# Patient Record
Sex: FEMALE | Race: WHITE | NOT HISPANIC OR LATINO | Employment: PART TIME | URBAN - METROPOLITAN AREA
[De-identification: names, ages, dates, MRNs, and addresses within clinical notes are randomized per-mention and may not be internally consistent; named-entity substitution may affect disease eponyms.]

---

## 2017-01-19 ENCOUNTER — TREATMENT (OUTPATIENT)
Dept: PHYSICAL THERAPY | Facility: CLINIC | Age: 37
End: 2017-01-19

## 2017-01-19 DIAGNOSIS — Z02.1 PRE-EMPLOYMENT HEALTH SCREENING EXAMINATION: Primary | ICD-10-CM

## 2017-01-19 PROCEDURE — PDS: Performed by: PHYSICAL THERAPIST

## 2017-01-19 NOTE — PROGRESS NOTES
See full documentation of patient's full treatment in paper chart dated 1/19/2017.  Chart will be in [facility] Med dept.

## 2018-07-30 ENCOUNTER — OFFICE VISIT CONVERTED (OUTPATIENT)
Dept: FAMILY MEDICINE CLINIC | Age: 38
End: 2018-07-30
Attending: FAMILY MEDICINE

## 2019-01-11 ENCOUNTER — OFFICE VISIT CONVERTED (OUTPATIENT)
Dept: FAMILY MEDICINE CLINIC | Age: 39
End: 2019-01-11
Attending: FAMILY MEDICINE

## 2019-01-14 ENCOUNTER — HOSPITAL ENCOUNTER (OUTPATIENT)
Dept: OTHER | Facility: HOSPITAL | Age: 39
Discharge: HOME | End: 2019-01-14
Attending: FAMILY MEDICINE

## 2019-01-14 LAB
ALBUMIN SERPL-MCNC: 4.6 G/DL (ref 3.5–5)
ALBUMIN/GLOB SERPL: 1.6 {RATIO} (ref 1.4–2.6)
ALP SERPL-CCNC: 78 U/L (ref 42–98)
ALT SERPL-CCNC: 13 U/L (ref 10–40)
ANION GAP SERPL CALC-SCNC: 17 MMOL/L (ref 8–19)
AST SERPL-CCNC: 17 U/L (ref 15–50)
BASOPHILS # BLD MANUAL: 0.06 10*3/UL (ref 0–0.2)
BASOPHILS NFR BLD MANUAL: 0.9 % (ref 0–3)
BILIRUB SERPL-MCNC: 0.26 MG/DL (ref 0.2–1.3)
BUN SERPL-MCNC: 7 MG/DL (ref 5–25)
BUN/CREAT SERPL: 10 {RATIO} (ref 6–20)
CALCIUM SERPL-MCNC: 9.1 MG/DL (ref 8.7–10.4)
CHLORIDE SERPL-SCNC: 105 MMOL/L (ref 99–111)
CHOLEST SERPL-MCNC: 204 MG/DL (ref 107–200)
CHOLEST/HDLC SERPL: 3.8 {RATIO} (ref 3–6)
CONV CO2: 23 MMOL/L (ref 22–32)
CONV TOTAL PROTEIN: 7.4 G/DL (ref 6.3–8.2)
CREAT UR-MCNC: 0.68 MG/DL (ref 0.5–0.9)
DEPRECATED RDW RBC AUTO: 44.4 FL
EOSINOPHIL # BLD MANUAL: 0.23 10*3/UL (ref 0–0.7)
EOSINOPHIL NFR BLD MANUAL: 3.4 % (ref 0–7)
ERYTHROCYTE [DISTWIDTH] IN BLOOD BY AUTOMATED COUNT: 13.5 % (ref 11.5–14.5)
GFR SERPLBLD BASED ON 1.73 SQ M-ARVRAT: >60 ML/MIN/{1.73_M2}
GLOBULIN UR ELPH-MCNC: 2.8 G/DL (ref 2–3.5)
GLUCOSE SERPL-MCNC: 101 MG/DL (ref 65–99)
GRANS (ABSOLUTE): 4.43 10*3/UL (ref 2–8)
GRANS: 65.2 % (ref 30–85)
HBA1C MFR BLD: 13.3 G/DL (ref 12–16)
HCT VFR BLD AUTO: 40.8 % (ref 37–47)
HDLC SERPL-MCNC: 54 MG/DL (ref 40–60)
IMM GRANULOCYTES # BLD: 0.01 10*3/UL (ref 0–0.54)
IMM GRANULOCYTES NFR BLD: 0.1 % (ref 0–0.43)
LDLC SERPL CALC-MCNC: 129 MG/DL (ref 70–100)
LYMPHOCYTES # BLD MANUAL: 1.62 10*3/UL (ref 1–5)
LYMPHOCYTES NFR BLD MANUAL: 6.5 % (ref 3–10)
MCH RBC QN AUTO: 29.2 PG (ref 27–31)
MCHC RBC AUTO-ENTMCNC: 32.6 G/DL (ref 33–37)
MCV RBC AUTO: 89.5 FL (ref 81–99)
MONOCYTES # BLD AUTO: 0.44 10*3/UL (ref 0.2–1.2)
OSMOLALITY SERPL CALC.SUM OF ELEC: 288 MOSM/KG (ref 273–304)
PLATELET # BLD AUTO: 295 10*3/UL (ref 130–400)
PMV BLD AUTO: 10.6 FL (ref 7.4–10.4)
POTASSIUM SERPL-SCNC: 4.5 MMOL/L (ref 3.5–5.3)
RBC # BLD AUTO: 4.56 10*6/UL (ref 4.2–5.4)
SODIUM SERPL-SCNC: 140 MMOL/L (ref 135–147)
TRIGL SERPL-MCNC: 103 MG/DL (ref 40–150)
VARIANT LYMPHS NFR BLD MANUAL: 23.9 % (ref 20–45)
VLDLC SERPL-MCNC: 21 MG/DL (ref 5–37)
WBC # BLD AUTO: 6.79 10*3/UL (ref 4.8–10.8)

## 2019-02-26 ENCOUNTER — OFFICE VISIT CONVERTED (OUTPATIENT)
Dept: FAMILY MEDICINE CLINIC | Age: 39
End: 2019-02-26
Attending: NURSE PRACTITIONER

## 2019-11-21 ENCOUNTER — OFFICE VISIT CONVERTED (OUTPATIENT)
Dept: FAMILY MEDICINE CLINIC | Age: 39
End: 2019-11-21
Attending: NURSE PRACTITIONER

## 2020-02-14 ENCOUNTER — OFFICE VISIT CONVERTED (OUTPATIENT)
Dept: FAMILY MEDICINE CLINIC | Age: 40
End: 2020-02-14
Attending: NURSE PRACTITIONER

## 2020-02-18 ENCOUNTER — HOSPITAL ENCOUNTER (OUTPATIENT)
Dept: OTHER | Facility: HOSPITAL | Age: 40
Discharge: HOME | End: 2020-02-18
Attending: NURSE PRACTITIONER

## 2020-04-29 ENCOUNTER — OFFICE VISIT CONVERTED (OUTPATIENT)
Dept: FAMILY MEDICINE CLINIC | Age: 40
End: 2020-04-29
Attending: NURSE PRACTITIONER

## 2020-05-05 ENCOUNTER — HOSPITAL ENCOUNTER (OUTPATIENT)
Dept: OTHER | Facility: HOSPITAL | Age: 40
Discharge: HOME | End: 2020-05-05
Attending: NURSE PRACTITIONER

## 2020-05-05 ENCOUNTER — OFFICE VISIT CONVERTED (OUTPATIENT)
Dept: FAMILY MEDICINE CLINIC | Age: 40
End: 2020-05-05
Attending: NURSE PRACTITIONER

## 2020-08-19 ENCOUNTER — OFFICE VISIT CONVERTED (OUTPATIENT)
Dept: FAMILY MEDICINE CLINIC | Age: 40
End: 2020-08-19
Attending: NURSE PRACTITIONER

## 2020-08-19 ENCOUNTER — HOSPITAL ENCOUNTER (OUTPATIENT)
Dept: OTHER | Facility: HOSPITAL | Age: 40
Discharge: HOME | End: 2020-08-19
Attending: NURSE PRACTITIONER

## 2020-08-20 LAB
ALBUMIN SERPL-MCNC: 4.2 G/DL (ref 3.5–5)
ALBUMIN/GLOB SERPL: 1.4 {RATIO} (ref 1.4–2.6)
ALP SERPL-CCNC: 88 U/L (ref 42–98)
ALT SERPL-CCNC: 18 U/L (ref 10–40)
ANION GAP SERPL CALC-SCNC: 15 MMOL/L (ref 8–19)
AST SERPL-CCNC: 24 U/L (ref 15–50)
BASOPHILS # BLD MANUAL: 0.04 10*3/UL (ref 0–0.2)
BASOPHILS NFR BLD MANUAL: 0.6 % (ref 0–3)
BILIRUB SERPL-MCNC: <0.15 MG/DL (ref 0.2–1.3)
BUN SERPL-MCNC: 6 MG/DL (ref 5–25)
BUN/CREAT SERPL: 7 {RATIO} (ref 6–20)
CALCIUM SERPL-MCNC: 9.6 MG/DL (ref 8.7–10.4)
CHLORIDE SERPL-SCNC: 104 MMOL/L (ref 99–111)
CHOLEST SERPL-MCNC: 208 MG/DL (ref 107–200)
CHOLEST/HDLC SERPL: 3.9 {RATIO} (ref 3–6)
CONV CO2: 24 MMOL/L (ref 22–32)
CONV TOTAL PROTEIN: 7.1 G/DL (ref 6.3–8.2)
CREAT UR-MCNC: 0.83 MG/DL (ref 0.5–0.9)
DEPRECATED RDW RBC AUTO: 44.7 FL
EOSINOPHIL # BLD MANUAL: 0.14 10*3/UL (ref 0–0.7)
EOSINOPHIL NFR BLD MANUAL: 2 % (ref 0–7)
ERYTHROCYTE [DISTWIDTH] IN BLOOD BY AUTOMATED COUNT: 13.2 % (ref 11.5–14.5)
GFR SERPLBLD BASED ON 1.73 SQ M-ARVRAT: >60 ML/MIN/{1.73_M2}
GLOBULIN UR ELPH-MCNC: 2.9 G/DL (ref 2–3.5)
GLUCOSE SERPL-MCNC: 95 MG/DL (ref 65–99)
GRANS (ABSOLUTE): 5.1 10*3/UL (ref 2–8)
GRANS: 72.9 % (ref 30–85)
HBA1C MFR BLD: 12.6 G/DL (ref 12–16)
HCT VFR BLD AUTO: 38.9 % (ref 37–47)
HDLC SERPL-MCNC: 54 MG/DL (ref 40–60)
IMM GRANULOCYTES # BLD: 0.01 10*3/UL (ref 0–0.54)
IMM GRANULOCYTES NFR BLD: 0.1 % (ref 0–0.43)
LDLC SERPL CALC-MCNC: 140 MG/DL (ref 70–100)
LYMPHOCYTES # BLD MANUAL: 1.19 10*3/UL (ref 1–5)
LYMPHOCYTES NFR BLD MANUAL: 7.4 % (ref 3–10)
MCH RBC QN AUTO: 29.3 PG (ref 27–31)
MCHC RBC AUTO-ENTMCNC: 32.4 G/DL (ref 33–37)
MCV RBC AUTO: 90.5 FL (ref 81–99)
MONOCYTES # BLD AUTO: 0.52 10*3/UL (ref 0.2–1.2)
OSMOLALITY SERPL CALC.SUM OF ELEC: 285 MOSM/KG (ref 273–304)
PLATELET # BLD AUTO: 282 10*3/UL (ref 130–400)
PMV BLD AUTO: 10 FL (ref 7.4–10.4)
POTASSIUM SERPL-SCNC: 4.2 MMOL/L (ref 3.5–5.3)
RBC # BLD AUTO: 4.3 10*6/UL (ref 4.2–5.4)
SODIUM SERPL-SCNC: 139 MMOL/L (ref 135–147)
TRIGL SERPL-MCNC: 69 MG/DL (ref 40–150)
TSH SERPL-ACNC: 1.37 M[IU]/L (ref 0.27–4.2)
VARIANT LYMPHS NFR BLD MANUAL: 17 % (ref 20–45)
VLDLC SERPL-MCNC: 14 MG/DL (ref 5–37)
WBC # BLD AUTO: 7 10*3/UL (ref 4.8–10.8)

## 2020-08-21 LAB
ASO AB SERPL-ACNC: 181 [IU]/ML (ref 0–200)
CONV RHEUMATOID FACTOR IGM: <10 [IU]/ML (ref 0–14)
CRP SERPL-MCNC: 2.9 MG/L (ref 0–5)
DSDNA AB SER-ACNC: NEGATIVE [IU]/ML
ENA AB SER IA-ACNC: NEGATIVE {RATIO}
ERYTHROCYTE [SEDIMENTATION RATE] IN BLOOD: 9 MM/H (ref 0–20)
PHOSPHATE SERPL-MCNC: 3.7 MG/DL (ref 2.4–4.5)
URATE SERPL-MCNC: 3.2 MG/DL (ref 2.5–7.5)

## 2020-08-31 ENCOUNTER — CONVERSION ENCOUNTER (OUTPATIENT)
Dept: CARDIOLOGY | Facility: CLINIC | Age: 40
End: 2020-08-31
Attending: INTERNAL MEDICINE

## 2021-01-13 ENCOUNTER — OFFICE VISIT CONVERTED (OUTPATIENT)
Dept: FAMILY MEDICINE CLINIC | Age: 41
End: 2021-01-13
Attending: NURSE PRACTITIONER

## 2021-01-21 ENCOUNTER — HOSPITAL ENCOUNTER (OUTPATIENT)
Dept: OTHER | Facility: HOSPITAL | Age: 41
Discharge: HOME | End: 2021-01-21
Attending: NURSE PRACTITIONER

## 2021-02-09 ENCOUNTER — HOSPITAL ENCOUNTER (OUTPATIENT)
Dept: OTHER | Facility: HOSPITAL | Age: 41
Discharge: HOME | End: 2021-02-09
Attending: NURSE PRACTITIONER

## 2021-03-10 ENCOUNTER — HOSPITAL ENCOUNTER (OUTPATIENT)
Dept: OTHER | Facility: HOSPITAL | Age: 41
Discharge: HOME | End: 2021-03-10
Attending: NURSE PRACTITIONER

## 2021-05-10 NOTE — PROCEDURES
Procedure Note      Patient Name: Honey Vizcarra   Patient ID: [id number]   Sex: Female   YOB: 1980    Referring Provider: Katlyn NICK    Visit Date: August 31, 2020    Provider: Meena Yu MD   Location: [facility]   Location Address: [address] [place] [address], [facility]   [place]  [id number]   Location Phone: [phone]          FINAL REPORT   HOLTER MONITOR REPORT  Date: 08/24/2020   Indication: Chest pain      The patient was monitored for a period of 48 hours starting from 15:11 on 08/24/2020.  The underlying rhythm was normal sinus rhythm with heart rates varying from 57 beats per minute to 128 beats per minute, average being 82 beats per minute.  Rare (7 in total) isolated ventricular ectopic beats noted.  Rare (8 in total) isolated supraventricular ectopic beats.  There were no significant pauses.  There were no arrhythmias.  The patient reported two episodes of chest pain and two episodes of palpitations during the study period.  Monitor strips at the time of reported symptoms showed sinus rhythm with no arrhythmias.     CONCLUSION:  Essentially normal 48-hour Holter monitor study.      MD BATSHEVA Malagon/cong    This note was transcribed by Zoie Morris.  cong/batsheva  The above service was transcribed by Zoie Morris, and I attest to the accuracy of the note.  BATSHEVA                 Electronically Signed by: Vanessa Morris-, Other -Author on September 1, 2020 08:18:13 AM  Electronically Co-signed by: Saud Cesar MD -Reviewer on September 1, 2020 09:09:30 AM

## 2021-05-18 NOTE — PROGRESS NOTES
Honey Vizcarra  1980     Office/Outpatient Visit    Visit Date: Wed, Aug 19, 2020 11:32 am    Provider: Katlyn Jasmine N.P. (Assistant: Milena Mancilla MA)    Location: [facility]        Electronically signed by Katlyn Jasmine N.P. on  08/21/2020 06:22:53 PM                             Subjective:        CC: Ms. Vizcarra is a 40 year old White female.  Patient presents today for physical exam, also wants to discuss joint pain X 3 weeks;         HPI:           Additionally, she presents with history of encounter for general adult medical examination without abnormal findings.  she is not currently using any form of contraception.   Her last Pap smear was in 9/2019 and was normal.   Her last mammogram was in 2/18/2020 and was normal.   Preventative Health updated today.  She is current with her Td.  She is not current with influenza immunization.  Tobacco: She has never smoked.      ROS:     CONSTITUTIONAL:  Negative for chills and fever.      EYES:  Negative for eye drainage and eye pain.      E/N/T:  Negative for ear pain and sore throat.      CARDIOVASCULAR:  Positive for chest pain ( occurs randomly on left side ) and palpitations.      RESPIRATORY:  Positive for dyspnea ( since teenager - worse with exertion ).   Negative for frequent wheezing.      GASTROINTESTINAL:  Negative for abdominal pain and vomiting.      GENITOURINARY:  Negative for dysuria and frequent urination.      MUSCULOSKELETAL:  Positive for arthralgias and joint stiffness.      INTEGUMENTARY/BREAST:  Negative for rash.      NEUROLOGICAL:  Negative for dizziness and headaches.      ENDOCRINE:  Negative for polydipsia and polyphagia.      PSYCHIATRIC:  Negative for depression and suicidal thoughts.          Past Medical History / Family History / Social History:         Last Reviewed on 8/19/2020 12:04 PM by Katlyn Jasmine    Past Medical History:                 PAST MEDICAL HISTORY         Interstitial cystitis     Iron  Deficiency Anemia: with pregnancy;     Allergies: seasonal;         CURRENT MEDICAL PROVIDERS:    Dermatologist    Dr Noble , at [facility] practice    surgeon dr goldsmith         PREVENTIVE HEALTH MAINTENANCE             MAMMOGRAM: Done within last 2 years and results in are chart was last done 2/18/20 The next one is due  2/19/21 US small cyst right breast     PAP SMEAR: was last done 9/2019 with normal results Dr Campbell         Surgical History:         Breast Augmentation - below the muscle; 5-23-12.  Other Surgeries    had a cut on arm and then had a scar revision;     Hernia Repair: 10-23-15 and 2-18-16; umbilical; 11-26-19 ventral hernia repair; Other Surgeries    GYN reconstructive sx 3-2017;         Family History:     Father: Cirrhosis ( Alcohol )     Mother: Hypertension; Peripheral Vascular Disease ( (smoker) )     Paternal uncle - bone CA         Social History:     Occupation: Homemaker     Marital Status:      Children: 2 children         Tobacco/Alcohol/Supplements:     Last Reviewed on 5/05/2020 02:16 PM by Sydney Chen    Tobacco: She has never smoked.          Alcohol: Frequency: Socially         Substance Abuse History:     Last Reviewed on 2/26/2019 02:59 PM by Vanessa Coon        Mental Health History:     Last Reviewed on 2/26/2019 02:59 PM by Vanessa Coon        Communicable Diseases (eg STDs):     Last Reviewed on 2/26/2019 02:59 PM by Vanessa Coon        Current Problems:     Last Reviewed on 2/26/2019 02:59 PM by Vanessa Coon    Neoplasm of uncertain behavior of left kidney    Renal mass    Allergic rhinitis, unspecified    Atopic dermatitis, unspecified    Encounter for screening for lipoid disorders    Encounter for screening for depression    Unspecified lump in right breast, subareolar    Furuncle of back [any part, except buttock]    Neoplasm of uncertain behavior of skin    Squamous cell carcinoma of skin of other part of trunk     Chest pain, unspecified    Encounter for general adult medical examination without abnormal findings    Pain in unspecified joint        Immunizations:     Tdap 5/24/2018        Allergies:     Last Reviewed on 8/19/2020 11:35 AM by Milena Mancilla    Erythromycin Base:          Current Medications:     Last Reviewed on 8/19/2020 11:35 AM by Milena Mancilla    None        Objective:        Vitals:         Historical:     5/5/2020  BP:   114/68 mm Hg ( (left arm, , sitting, );) 5/5/2020  P:   83bpm ( (left arm (BP Cuff), , sitting, );) 5/5/2020  Wt:   143.2lbs    Current: 8/19/2020 11:36:10 AM    Ht:  5 ft, 4.5 in;  Wt: 135.1 lbs;  BMI: 22.8T: 98.5 F (temporal);  BP: 105/55 mm Hg (left arm, sitting);  P: 110 bpm (left arm (BP Cuff), sitting);  sCr: 0.68 mg/dL;  GFR: 104.45        Exams:     PHYSICAL EXAM:     GENERAL: well developed, well nourished;  no apparent distress;     EYES: PERRL, EOMI     E/N/T: EARS: external auditory canal normal;  bilateral TMs are normal;  NOSE: normal turbinates; no sinus tenderness; OROPHARYNX: oral mucosa is normal; posterior pharynx shows no exudate;     NECK: range of motion is normal; trachea is midline;     RESPIRATORY: normal respiratory rate and pattern with no distress; normal breath sounds with no rales, rhonchi, wheezes or rubs;     CARDIOVASCULAR: normal rate; rhythm is regular;     GASTROINTESTINAL: nontender; normal bowel sounds; no organomegaly;     MUSCULOSKELETAL: normal gait; normal range of motion of all major muscle groups; no limb or joint pain with range of motion;     NEUROLOGIC: mental status: alert and oriented x 3; GROSSLY INTACT     PSYCHIATRIC: appropriate affect and demeanor; normal speech pattern;         Lab/Test Results:         LABORATORY RESULTS: EKG performed by pr         Assessment:         Z00.00   Encounter for general adult medical examination without abnormal findings       Z13.31   Encounter for screening for depression       M25.50   Pain in  unspecified joint       R07.9   Chest pain, unspecified           ORDERS:         Radiology/Test Orders:       [id number]  Electrocardiogram, routine with at least 12 leads; with interpretation and report  (In-House)              Lab Orders:       [id number]  [facility] Arthritis Profile  (Send-Out)            [id number]  [facility] PHYSICAL: CMP, CBC, TSH, LIPID: [id number], [id number], [id number], [id number]  (Send-Out)              Other Orders:         Depression screen negative  (In-House)              Screening mammogram results documented  (Send-Out)                      Plan:         Encounter for general adult medical examination without abnormal findingsUTD mammogram and pap smear    LABORATORY:  Labs ordered to be performed today include PHYSICAL PANEL; CMP, CBC, TSH, LIPID.  MIPS Vaccines Flu and Pneumonia updated in Shot record Screening mammomgram done within last 2 years and results in are chart     COUNSELING was provided today regarding the following topics: healthy eating habits and breast self-exam.      FOLLOW-UP: Schedule a follow-up visit in 12 months.            Orders:       [id number]  [facility] PHYSICAL: CMP, CBC, TSH, LIPID: [id number], [id number], [id number], [id number]  (Send-Out)              Screening mammogram results documented  (Send-Out)              Encounter for screening for depression    MIPS PHQ-9 Depression Screening: Completed form scanned and in chart; Total Score 2; Negative Depression Screen           Orders:         Depression screen negative  (In-House)              Pain in unspecified joint    LABORATORY:  Labs ordered to be performed today include Arthritis Profile.      RECOMMENDATIONS given include: Further recommendation to be given after test results are complete.            Orders:       [id number]  [facility] Arthritis Profile  (Send-Out)              Chest pain, unspecifiedECG without acute ST or T wave abnormalities.         TESTS/PROCEDURES:  Will proceed with an ECG to be performed/scheduled now.             Orders:       [id number]  Electrocardiogram, routine with at least 12 leads; with interpretation and report  (In-House)                  Patient Recommendations:        For  Encounter for general adult medical examination without abnormal findings:        Limit dietary intake of fat (especially saturated fat) and cholesterol.  Eat a variety of foods, including plenty of fruits, vegetables, and grain containg fiber, limit fat intake to 30% of total calories. Balance caloric intake with energy expended.    You should regularly examine your breasts, easily done while in the shower or with lotion.  Feel and look for differences in consistency from month to month, especially noting knots or lumps, changes in skin appearance, nipple retraction or discharge.  Schedule a follow-up visit in 12 months.          For  Pain in unspecified joint:    I also recommend ^.              Charge Capture:         Primary Diagnosis:     Z00.00  Encounter for general adult medical examination without abnormal findings           Orders:      [id number]  Preventive medicine, established patient, age 40-64 years  (In-House)              Z13.31  Encounter for screening for depression           Orders:      [id number]  Office/outpatient visit; established patient, level 3  (In-House)              Depression screen negative  (In-House)              M25.50  Pain in unspecified joint     R07.9  Chest pain, unspecified           Orders:      [id number]  Electrocardiogram, routine with at least 12 leads; with interpretation and report  (In-House)                      None

## 2021-05-18 NOTE — PROGRESS NOTES
Honey Vizcarra  1980     Office/Outpatient Visit    Visit Date: Thu, Nov 21, 2019 02:23 pm    Provider: Katlyn Jasmine N.P. (Assistant: Sulaiman Larose, )    Location: [facility]        Electronically signed by Katlyn Jasmine N.P. on  11/21/2019 05:58:14 PM                             Subjective:        CC: Ms. Vizcarra is a 39 year old White female.  presents today due to congestion,sore throat, fever, cough, body aches x3 weeks         HPI:           Ms. Vizcarra presents with acute upper respiratory infection, unspecified.  These have been present for the past 3 weeks.  The symptoms include productive cough, ear complaints, sinus pain/pressure, sore throat and post nasal drip.  She reports recent exposure to illness from Children.  She has already tried to relieve the symptoms with antihistamines ( Benadryl ).      ROS:     CONSTITUTIONAL:  Negative for chills and fever.      EYES:  Negative for eye drainage and eye pain.      E/N/T:  Positive for ear pain, nasal congestion, sinus pressure and sore throat.      CARDIOVASCULAR:  Negative for chest pain and palpitations.      RESPIRATORY:  Positive for recent cough.   Negative for dyspnea or frequent wheezing.          Past Medical History / Family History / Social History:         Last Reviewed on 2/26/2019 02:59 PM by Vanessa Coon    Past Medical History:                 PAST MEDICAL HISTORY         interstiial cystitis - Dr. Mcdowell managing     Iron Deficiency Anemia: with pregnancy;     Allergies: seasonal;     has had folliculitis         GYNECOLOGICAL HISTORY:    Not currently using any form of contraception.          CURRENT MEDICAL PROVIDERS:    Dr Noble , at [facility]    surgeon dr goldsmith         Surgical History:         Breast Augmentation - below the muscle; 5-23-12.      had a cut on arm and then had a scar revision; Procedures: mri of thr right shoulder , arthrogram and xrays of the shoulder, due to a job  injury     Hernia Repair: 10-23-15 and 2-18-16; umbilical;     GYN sx 3-2017;         Family History:     Father: Hypertension; Hyperlipidemia     Mother: Hypertension     Sister(s): Healthy; 1 sister(s) total     Daughter(s): 1 daughter(s) total         Social History:     Occupation: Unemployed     Marital Status:      Children: 1 child         Tobacco/Alcohol/Supplements:     Last Reviewed on 2/26/2019 02:59 PM by Vanessa Coon    Tobacco: She has never smoked.  Non-drinker     Caffeine:  She admits to consuming caffeine via tea ( 6 servings per day ) and soda ( 1-2 servings per week ).          Substance Abuse History:     Last Reviewed on 2/26/2019 02:59 PM by Vanessa Coon        Mental Health History:     Last Reviewed on 2/26/2019 02:59 PM by Vanessa Coon        Communicable Diseases (eg STDs):     Last Reviewed on 2/26/2019 02:59 PM by Vanessa Coon        Current Problems:     Last Reviewed on 2/26/2019 02:59 PM by Vanessa Coon    Neoplasm of uncertain behavior of left kidney    Renal mass    Allergies    Allergic rhinitis, unspecified    Atopic dermatitis, unspecified    Atopic dermatitis, other    Screening for lipoid disorders    Encounter for screening for lipoid disorders        Immunizations:     None        Allergies:     Last Reviewed on 2/26/2019 02:59 PM by Vanessa Coon    Erythromycin Base:          Current Medications:     Last Reviewed on 2/26/2019 02:59 PM by Vanessa Coon    Prenatal Multivitamin  Capsules        Objective:        Vitals:         Current: 11/21/2019 2:29:21 PM    Ht:  5 ft, 4.5 in;  Wt: 165.2 lbs;  BMI: 27.9T: 98.3 F (oral);  BP: 133/87 mm Hg (right arm, sitting);  P: 107 bpm (right arm (BP Cuff), sitting);  sCr: 0.68 mg/dL;  GFR: 114.88        Exams:     PHYSICAL EXAM:     GENERAL: well developed, well nourished;  no apparent distress;     EYES: PERRL, EOMI     E/N/T: EARS: external auditory canal normal;  both TMs are have fluid  behind them;  NOSE: turbinates are mildly swollen bilaterally;  bilateral maxillary and bilateral frontal sinus tenderness present; OROPHARYNX: oral mucosa is normal; posterior pharynx shows no exudate and post nasal drip;     NECK: range of motion is normal; trachea is midline;     RESPIRATORY: normal appearance and symmetric expansion of chest wall; normal respiratory rate and pattern with no distress; normal breath sounds with no rales, rhonchi, wheezes or rubs;     CARDIOVASCULAR: normal rate; rhythm is regular;     NEUROLOGIC: mental status: alert and oriented x 3; GROSSLY INTACT     PSYCHIATRIC: appropriate affect and demeanor;         Lab/Test Results:         Influenza A and B: Negative (11/21/2019),     Performed by:: pr (11/21/2019),     Rapid Strep Screen: Negative (11/21/2019),             Procedures:     Acute sinusitis    1. Bicillin LA 1.2 million units given IM in the right hip; administered by AS;  lot number [id number]; expires 2021             Assessment:         J01   Acute sinusitis           ORDERS:         Meds Prescribed:       [New Rx] fluticasone propionate 50 mcg/actuation Intranasal Spray, Suspension [inhale 1 spray (50 mcg) in each nostril by intranasal route once daily], #9 (nine) milliliters, Refills: 0 (zero)         Lab Orders:       [id number]  Infectious agent antigen detection by immunoassay; Influenza  (In-House)            85445  Infectious agent antigen detection by immunoassay; Influenza  (In-House)            85739  Group A Streptococcus detection by immunoassay with direct optical observation  (In-House)              Other Orders:       [id number]  Therapeutic injection  (In-House)               Penicillin G benzathine, 100,000 units (use 12 in units)  (In-House)                      Plan:         Acute sinusitisFlu swab, RSS negative. Pt prefers injection rather than oral medication.         RECOMMENDATIONS given include: Push Fluids, Rest, Follow up if no improvement or worsening  symptoms like high fevers, vomiting, weakness, or increasing shortness of air.    .  Antibiotics Bicillin L-A: 1.2 million units     FOLLOW-UP: Chronic visit follow up           Prescriptions:       [New Rx] fluticasone propionate 50 mcg/actuation Intranasal Spray, Suspension [inhale 1 spray (50 mcg) in each nostril by intranasal route once daily], #9 (nine) milliliters, Refills: 0 (zero)           Orders:       36068  Infectious agent antigen detection by immunoassay; Influenza  (In-House)            06623  Infectious agent antigen detection by immunoassay; Influenza  (In-House)            30047  Group A Streptococcus detection by immunoassay with direct optical observation  (In-House)            00219  Therapeutic injection  (In-House)               Penicillin G benzathine, 100,000 units (use 12 in units)  (In-House)                  Charge Capture:         Primary Diagnosis:     J01  Acute sinusitis           Orders:      70427  Office/outpatient visit; established patient, level 3  (In-House)            46002  Infectious agent antigen detection by immunoassay; Influenza  (In-House)            68277  Infectious agent antigen detection by immunoassay; Influenza  (In-House)            67106  Group A Streptococcus detection by immunoassay with direct optical observation  (In-House)            66718  Therapeutic injection  (In-House)               Penicillin G benzathine, 100,000 units (use 12 in units)  (In-House)

## 2021-05-18 NOTE — PROGRESS NOTES
Honey Vizcarra  1980     Office/Outpatient Visit    Visit Date: Fri, Feb 14, 2020 09:14 am    Provider: Katlyn Jasmine N.P. (Assistant: Milena Mancilla MA)    Location: [facility]        Electronically signed by Katlyn Jasmine N.P. on  02/14/2020 12:59:12 PM                             Subjective:        CC: Ms. Vizcarra is a 40 year old White female.  Patient presents today with complaints of tender knot in right breast X 5 days (not taking Benadryl);         HPI: Honey presents with c/o 5 day history of right breast knot and tenderness. She is not breast feeding but did produce milk after birth of last child. Last menstrual period February 7th. Has not had mammogram. She had a friend that passed away from metastatic breast cancer and that has her worried. She has silicone implants that she had done in 2012 (Dr Topete).    ROS:     CONSTITUTIONAL:  Negative for chills and fever.      CARDIOVASCULAR:  Negative for chest pain and palpitations.      RESPIRATORY:  Negative for dyspnea and frequent wheezing.      INTEGUMENTARY/BREAST:  Positive for breast mass and breast tenderness.   Negative for skin changes of breast or nipple discharge.      NEUROLOGICAL:  Negative for dizziness and headaches.      PSYCHIATRIC:  Negative for depression and suicidal thoughts.          Past Medical History / Family History / Social History:         Last Reviewed on 2/26/2019 02:59 PM by Vanessa Coon    Past Medical History:                 PAST MEDICAL HISTORY         interstiial cystitis - Dr. Mcdowell managing     Iron Deficiency Anemia: with pregnancy;     Allergies: seasonal;     has had folliculitis         GYNECOLOGICAL HISTORY:    Not currently using any form of contraception.          CURRENT MEDICAL PROVIDERS:    Dr Noble , at [facility] practice    surgeon dr goldsmith         Surgical History:         Breast Augmentation - below the muscle; 5-23-12.      had a cut on arm and then had a scar  revision; Procedures: mri of thr right shoulder , arthrogram and xrays of the shoulder, due to a job injury     Hernia Repair: 10-23-15 and 2-18-16; umbilical; 11-26-19 ventral hernia repair; Other Surgeries    GYN sx 3-2017;         Family History:     Father: Hypertension; Hyperlipidemia     Mother: Hypertension     Sister(s): Healthy; 1 sister(s) total     Daughter(s): 1 daughter(s) total         Social History:     Occupation: Unemployed     Marital Status:      Children: 1 child         Tobacco/Alcohol/Supplements:     Last Reviewed on 11/21/2019 02:26 PM by Sulaiman Larose    Tobacco: She has never smoked.  Non-drinker     Caffeine:  She admits to consuming caffeine via tea ( 6 servings per day ) and soda ( 1-2 servings per week ).          Substance Abuse History:     Last Reviewed on 2/26/2019 02:59 PM by Vanessa Coon        Mental Health History:     Last Reviewed on 2/26/2019 02:59 PM by Vanessa Coon        Communicable Diseases (eg STDs):     Last Reviewed on 2/26/2019 02:59 PM by Vanessa Coon        Current Problems:     Last Reviewed on 2/26/2019 02:59 PM by Vanessa Coon    Neoplasm of uncertain behavior of left kidney    Renal mass    Allergies    Allergic rhinitis, unspecified    Atopic dermatitis, unspecified    Atopic dermatitis, other    Screening for lipoid disorders    Encounter for screening for lipoid disorders    Acute sinusitis        Immunizations:     None        Allergies:     Last Reviewed on 11/21/2019 02:26 PM by Sulaiman Larose    Erythromycin Base:          Current Medications:     Last Reviewed on 11/21/2019 02:27 PM by Sulaiman Larose    Benadryl     Prenatal Multivitamin  Capsules    fluticasone propionate 50 mcg/actuation Intranasal Spray, Suspension [inhale 1 spray (50 mcg) in each nostril by intranasal route once daily]        Objective:        Vitals:         Current: 2/14/2020 9:18:48 AM    Ht:  5 ft, 4.5 in;  Wt: 150.8 lbs;  BMI: 25.5T: 98.2 F  (oral);  BP: 122/76 mm Hg (right arm, sitting);  P: 97 bpm (right arm (BP Cuff), sitting);  sCr: 0.68 mg/dL;  GFR: 109.45        Exams:     PHYSICAL EXAM:     GENERAL: well developed, well nourished;  no apparent distress;     RESPIRATORY: normal respiratory rate and pattern with no distress; normal breath sounds with no rales, rhonchi, wheezes or rubs;     CARDIOVASCULAR: normal rate; rhythm is regular;     BREAST/INTEGUMENT: (+) breast mass: right subareolar tissue;     MUSCULOSKELETAL: normal gait;     NEUROLOGIC: mental status: alert and oriented x 3; GROSSLY INTACT     PSYCHIATRIC: affect/demeanor: anxious;         Assessment:         N63.41   Unspecified lump in right breast, subareolar       Z13.31   Encounter for screening for depression           ORDERS:         Radiology/Test Orders:       [id number]  Diagnostic mammography computer-aided detcj bi  (Send-Out)            32050  Mammography; bilateral  (Send-Out)              Other Orders:         Depression screen negative  (In-House)                      Plan:         Unspecified lump in right breast, subareolar        RADIOLOGY:  I have ordered a diagnostic mammogram on both breasts Diagnostic Bilateral No CAD to be done today.  PRN US    RECOMMENDATIONS given include: Further recommendation to be given after test results are complete.  MIPS Vaccines Flu and Pneumonia updated in Shot record     FOLLOW-UP: Chronic visit follow up           Orders:       [id number]  Diagnostic mammography computer-aided detcj bi  (Send-Out)            81414  Mammography; bilateral  (Send-Out)              Encounter for screening for depression    MIPS PHQ-9 Depression Screening: Completed form scanned and in chart; Total Score 0; Negative Depression Screen           Orders:         Depression screen negative  (In-House)                  Charge Capture:         Primary Diagnosis:     N63.41  Unspecified lump in right breast, subareolar           Orders:      65162   Office/outpatient visit; established patient, level 3  (In-House)              Z13.31  Encounter for screening for depression           Orders:        Depression screen negative  (In-House)

## 2021-05-18 NOTE — PROGRESS NOTES
"Honey Bishop. 1980     Office/Outpatient Visit    Visit Date: Fri, Jan 11, 2019 03:13 pm    Provider: Rosalee Charles MD (Assistant: Michelle Oconnor)    Location: [facility]        Electronically signed by Rosalee Charles MD on  01/11/2019 08:01:32 PM                             SUBJECTIVE:        CC:     Ms. Bishop is a 38 year old White female.  spots on scalp, dark spots on shoulder, sore/tender arm pits, breast exam;         HPI: Honey is here today for a breast exam and to have some skin lesions examined.  She had a friend of her age who recently passed away from metastatic breast cancer.  She has been worried since then.          She would like a breast exam today.  She has silicone implants that she had done 2012 (Dr. Topete).  She is not breast feeding.  Her left breast sags a bit more than the right.  No lumps that she has noticed.  She has noticed a \"weird feeling\" in the bilateral arm pits.          She has a mole above the L eyebrow and one on the L cheek.  They have been there about a year and a half.          She has a couple moles on the back that has been present for years.  One has gotten very dark.  The other has a wart-like texture.        She has a mole on her belly.  That one also has a wart-like texture.          She has a couple of cysts on the face and two on the leg that arose recently.      ROS:     CONSTITUTIONAL:  Negative for fatigue, fever, unintentional weight gain and unintentional weight loss.      EYES:  Negative for blurred vision.      E/N/T:  Negative for diminished hearing and nasal congestion.      CARDIOVASCULAR:  Negative for chest pain and palpitations.      RESPIRATORY:  Negative for recent cough and dyspnea.      GASTROINTESTINAL:  Negative for abdominal pain, constipation, diarrhea, nausea and vomiting.      MUSCULOSKELETAL:  Negative for arthralgias and myalgias.      INTEGUMENTARY/BREAST:  Positive for atypical mole(s) and rash.   Negative for breast " mass, skin changes of breast, breast tenderness or nipple discharge.          PMH/FMH/SH:     Last Reviewed on 1/11/2019 03:41 PM by Rosalee Charles    Past Medical History:                 PAST MEDICAL HISTORY         interstiial cystitis - Dr. Mcdowell managing     Iron Deficiency Anemia: with pregnancy;     Allergies: seasonal;     has had folliculitis         GYNECOLOGICAL HISTORY:    Not currently using any form of contraception.          CURRENT MEDICAL PROVIDERS:    Dr Noble , at [facility]    surgeon dr goldsmith         Surgical History:         Breast Augmentation - below the muscle; 5-23-12.      had a cut on arm and then had a scar revision; Procedures: mri of thr right shoulder , arthrogram and xrays of the shoulder, due to a job injury     Hernia Repair: 10-23-15 and 2-18-16; umbilical;      GYN sx 3-2017;         Family History:     Father: Hypertension; Hyperlipidemia     Mother: Hypertension     Sister(s): Healthy; 1 sister(s) total     Daughter(s): 1 daughter(s) total         Social History:     Occupation: Unemployed     Marital Status:      Children: 1 child         Tobacco/Alcohol/Supplements:     Last Reviewed on 1/11/2019 03:41 PM by Rosalee Charles    Tobacco: She has never smoked.  Non-drinker     Caffeine:  She admits to consuming caffeine via tea ( 6 servings per day ) and soda ( 1-2 servings per week ).          Substance Abuse History:     Last Reviewed on 1/11/2019 03:41 PM by Rosalee Charles        Mental Health History:     Last Reviewed on 1/11/2019 03:41 PM by Rosalee Charles        Communicable Diseases (eg STDs):     Last Reviewed on 1/11/2019 03:41 PM by Rosalee Charles            Current Problems:     Last Reviewed on 7/30/2018 03:18 PM by Dirk Montoya    Atopic dermatitis, other     Allergies     Renal mass         Immunizations:     None        Allergies:     Last Reviewed on 7/30/2018 03:15 PM by Dirk Montoya    Erythromycin  "Base:        Current Medications:     Last Reviewed on 7/30/2018 03:17 PM by Dirk Montoya    None        OBJECTIVE:        Vitals:         Current: 1/11/2019 3:18:08 PM    Ht:  5 ft, 4.5 in;  Wt: 157.8 lbs;  BMI: 26.7    T: 98.5 F (oral);  BP: 136/83 mm Hg (left arm, sitting);  P: 96 bpm (left arm (BP Cuff), sitting);  sCr: 0.66 mg/dL;  GFR: 117.20        Exams:     PHYSICAL EXAM:     GENERAL: vital signs recorded - well developed, well nourished;  well groomed;  no apparent distress;     EYES: extraocular movements intact; conjunctiva and cornea are normal; PERRLA;     E/N/T: OROPHARYNX:  normal mucosa, dentition, gingiva, and posterior pharynx;     RESPIRATORY: normal respiratory rate and pattern with no distress; normal breath sounds with no rales, rhonchi, wheezes or rubs;     CARDIOVASCULAR: normal rate; rhythm is regular;  no systolic murmur; no edema;     GASTROINTESTINAL: nontender; normal bowel sounds;     BREAST/INTEGUMENT: breast exam: no overlying skin changes; no breast masses; seborrheic keratoses; benign nevi;     MUSCULOSKELETAL: normal gait; normal overall tone         ASSESSMENT           V65.5   Z71.1  \"Worried well\"              DDx:     V77.91   Z13.220  Screening for lipoid disorders              DDx:     V81.2   Z13.6  Screening for cardiovascular conditions              DDx:         ORDERS:         Lab Orders:       [id number]  [facility] Lipid Panel  (Send-Out)         41085  [facility] CBC with 3 part diff  (Send-Out)         52122  [facility] Comp. Metabolic Panel  (Send-Out)                   PLAN:          \"Worried well\" Skin exam today and breast exam are all completely normal.  She really is mostly concerned because her friend recently passed away unexpectedly from metastatic breast cancer.  Will get some screening labs on her as below and reassurance provided.  She should keep an eye on the nevi and if any demonstrate alarming changes (these were discussed), she should let me " "know and we can get her in for a biopsy.         30 min were spent in this face-to-face encounter.          Screening for lipoid disorders     LABORATORY:  Labs ordered to be performed today include CBC, Comprehensive metabolic panel, and lipid panel.            Orders:       [id number]  LPDP - [facility] Lipid Panel  (Send-Out)         15266  BDCBC - [facility] CBC with 3 part diff  (Send-Out)         63799  COMP - [facility] Comp. Metabolic Panel  (Send-Out)               CHARGE CAPTURE           **Please note: ICD descriptions below are intended for billing purposes only and may not represent clinical diagnoses**        Primary Diagnosis:         V65.5 \"Worried well\"            Z71.1    Person with feared health complaint in whom no diagnosis is made              Orders:          46834   Office/outpatient visit; established patient, level 4  (In-House)           V77.91 Screening for lipoid disorders            Z13.220    Encounter for screening for lipoid disorders    V81.2 Screening for cardiovascular conditions            Z13.6    Encounter for screening for cardiovascular disorders        ADDENDUMS:      ____________________________________    Addendum: 01/15/2019 12:26 PM - Rosalee Charles        Please add L82.1 Other seborrheic keratosis as primary diagnosis.  Thanks, WILLIAM           "

## 2021-05-18 NOTE — PROGRESS NOTES
Honey Vizcarra  1980     Office/Outpatient Visit    Visit Date: Tue, May 5, 2020 02:07 pm    Provider: Lesly Jacobsen N.P. (Assistant: Sydney Chen, )    Location: [facility]        Electronically signed by Lesly Jacobsen N.P. on  05/05/2020 04:18:00 PM                             Subjective:        CC: Ms. Vizcarra is a 40 year old White female.  spot on back of shoulder;         HPI:       Here for lesion right shoulder blade area that has gotten bigger over last couple months, noticed about 8 mo ago and now its the size of a black olive hanging on there, it itches some times and has tried to  pull it off or squeeze it but has not went away     ROS:     CONSTITUTIONAL:  Negative for chills, fatigue, fever, and weight change.      CARDIOVASCULAR:  Negative for chest pain, orthopnea, paroxysmal nocturnal dyspnea and pedal edema.      RESPIRATORY:  Negative for dyspnea.      INTEGUMENTARY/BREAST:  Positive for lesion right shoulder , getting bigger over last few months.      PSYCHIATRIC:  Negative for anxiety, depression, and sleep disturbances.          Past Medical History / Family History / Social History:         Last Reviewed on 2/26/2019 02:59 PM by Vanessa Coon    Past Medical History:                 PAST MEDICAL HISTORY         interstiial cystitis - Dr. Mcdowell managing     Iron Deficiency Anemia: with pregnancy;     Allergies: seasonal;     has had folliculitis         GYNECOLOGICAL HISTORY:    Not currently using any form of contraception.          CURRENT MEDICAL PROVIDERS:    Dr Noble , at [facility]    surgeon dr goldsmith         PREVENTIVE HEALTH MAINTENANCE             MAMMOGRAM: Done within last 2 years and results in are chart was last done 2/18/20 The next one is due  2/19/21 US small cyst right breast         Surgical History:         Breast Augmentation - below the muscle; 5-23-12.      had a cut on arm and then had a scar revision;  Procedures: mri of thr right shoulder , arthrogram and xrays of the shoulder, due to a job injury     Hernia Repair: 10-23-15 and 2-18-16; umbilical; 11-26-19 ventral hernia repair; Other Surgeries    GYN sx 3-2017;         Family History:     Father: Hypertension; Hyperlipidemia     Mother: Hypertension     Sister(s): Healthy; 1 sister(s) total     Daughter(s): 1 daughter(s) total         Social History:     Occupation: Unemployed     Marital Status:      Children: 1 child         Tobacco/Alcohol/Supplements:     Last Reviewed on 4/29/2020 11:02 AM by Ingris Wood    Tobacco: She has never smoked.  Non-drinker     Caffeine:  She admits to consuming caffeine via tea ( 6 servings per day ) and soda ( 1-2 servings per week ).          Substance Abuse History:     Last Reviewed on 2/26/2019 02:59 PM by Vanessa Coon        Mental Health History:     Last Reviewed on 2/26/2019 02:59 PM by Vanessa Coon        Communicable Diseases (eg STDs):     Last Reviewed on 2/26/2019 02:59 PM by Vanessa Coon        Current Problems:     Last Reviewed on 2/26/2019 02:59 PM by Vanessa Coon    Neoplasm of uncertain behavior of left kidney    Renal mass    Allergies    Allergic rhinitis, unspecified    Atopic dermatitis, unspecified    Atopic dermatitis, other    Screening for lipoid disorders    Encounter for screening for lipoid disorders    Acute sinusitis    Unspecified lump in right breast, subareolar    Encounter for screening for depression    Furuncle of back [any part, except buttock]        Immunizations:     None        Allergies:     Last Reviewed on 4/29/2020 11:02 AM by Ingris Wood    Erythromycin Base:          Current Medications:     Last Reviewed on 4/29/2020 11:02 AM by Ingris Wood    Bactrim -160 mg oral tablet [take 1 tablet by oral route every 12 hours]        Objective:        Vitals:         Current: 5/5/2020 2:17:44 PM    Ht:  5 ft, 4.5 in;  Wt: 143.2 lbs;  BMI: 24.2T:  98.6 F (oral);  BP: 114/68 mm Hg (left arm, sitting);  P: 83 bpm (left arm (BP Cuff), sitting);  sCr: 0.68 mg/dL;  GFR: 107.07        Exams:     PHYSICAL EXAM:     GENERAL: vital signs recorded - well developed, well nourished;  no apparent distress;     RESPIRATORY: normal respiratory rate and pattern with no distress; normal breath sounds with no rales, rhonchi, wheezes or rubs;     CARDIOVASCULAR: normal rate; rhythm is regular;  no systolic murmur; no edema;     BREAST/INTEGUMENT: pedunculated olive size pink lesion right shoulder blade, sent to path;     NEUROLOGIC: GROSSLY INTACT     PSYCHIATRIC:  appropriate affect and demeanor; normal speech pattern; grossly normal memory;         Procedures:     Neoplasm of uncertain behavior of skin        OPERATIVE REPORT:     Informed consent obtained in writing.  The patient expressed understanding of the potential risks and complications discussed including pain, bleeding, infection, bruising, nerve damage, scarring, and need for additional surgery.  Sterile technique was observed.  Wound care instructions were given to the patient.      DIAGNOSIS: skin lesion atypical    LOCATION:  right shoulder blade    LESION SIZE: black olive size    CONFIRMED BY BX #:  This lesion has not previously been biopsied.    METHOD OF REMOVAL: shave removal    PREP: alcohol scrub and betadine x 3    ANESTHESIA: 1% lidocaine with epinephrine ( 1 cc ) infiltrated locally    EXCISION LEVEL: through the dermis into the subcuteous fat    HEMOSTASIS: application of pressure    PATHOLOGY:  The specimen was sent for pathology.    COMPLICATIONS: no complications    DRESSING: Polysporin, bandaid, and pressure dressing consisting of gauze and Hypafix tape             Assessment:         D48.5   Neoplasm of uncertain behavior of skin           ORDERS:         Procedures Ordered:       [id number]  Shaving of epidermal or dermal lesion, single lesion, trunk, arm, legs; lesion diameter 0.6 -1.0 cm   (In-House)                      Plan:         Neoplasm of uncertain behavior of skinsent for path, will call with results, if needed will refer to Derm for deeper excision. dmt          Orders:       [id number]  Shaving of epidermal or dermal lesion, single lesion, trunk, arm, legs; lesion diameter 0.6 -1.0 cm  (In-House)                  Charge Capture:         Primary Diagnosis:     D48.5  Neoplasm of uncertain behavior of skin           Orders:      [id number]  Shaving of epidermal or dermal lesion, single lesion, trunk, arm, legs; lesion diameter 0.6 -1.0 cm  (In-House)                  ADDENDUMS:      ____________________________________    Addendum: 05/05/2020 04:31 PM - Carlene Bowles        Please add [phone] handling fee. kh

## 2021-05-18 NOTE — PROGRESS NOTES
Honey Vizcarra J  1980     Office/Outpatient Visit    Visit Date: Wed, Apr 29, 2020 11:00 am    Provider: Katlyn Jasmine N.P. (Assistant: Ingris Wood RN)    Location: [facility]        Electronically signed by Katlyn Jasmine N.P. on  04/29/2020 11:32:57 AM                             Subjective:        CC: Ms. Vizcarra is a 40 year old White female.  Boil on right shoulder/back; [email or web address]        HPI: Honey consented to this telemedicine consultation. Consent obtained by Ingris Wood RN and PARK Segura. This visit is being conducted by [employer] audio and video.     Honey reports that she had a boil develop on her right upper back a couple of months ago. It grew and became itching. She had a scab come off of it a couple of weeks ago and it has been draining clear drainage. Has been keeping covered with bandaid. She has had boils develop in the past. No recent antibiotic use.    ROS:     CONSTITUTIONAL:  Negative for chills and fever.      CARDIOVASCULAR:  Negative for chest pain and palpitations.      RESPIRATORY:  Negative for dyspnea and frequent wheezing.      INTEGUMENTARY/BREAST:  Positive for boil on back.      NEUROLOGICAL:  Negative for dizziness and headaches.      PSYCHIATRIC:  Negative for depression and suicidal thoughts.          Past Medical History / Family History / Social History:         Last Reviewed on 2/26/2019 02:59 PM by Vanessa Coon    Past Medical History:                 PAST MEDICAL HISTORY         interstiial cystitis - Dr. Mcdowell managing     Iron Deficiency Anemia: with pregnancy;     Allergies: seasonal;     has had folliculitis         GYNECOLOGICAL HISTORY:    Not currently using any form of contraception.          CURRENT MEDICAL PROVIDERS:    Dr Noble , at [facility]    surgeon dr goldsmith         PREVENTIVE HEALTH MAINTENANCE             MAMMOGRAM: Done within last 2 years and results in are chart was last done 2/18/20 The  next one is due  2/19/21 US small cyst right breast         Surgical History:         Breast Augmentation - below the muscle; 5-23-12.      had a cut on arm and then had a scar revision; Procedures: mri of thr right shoulder , arthrogram and xrays of the shoulder, due to a job injury     Hernia Repair: 10-23-15 and 2-18-16; umbilical; 11-26-19 ventral hernia repair; Other Surgeries    GYN sx 3-2017;         Family History:     Father: Hypertension; Hyperlipidemia     Mother: Hypertension     Sister(s): Healthy; 1 sister(s) total     Daughter(s): 1 daughter(s) total         Social History:     Occupation: Unemployed     Marital Status:      Children: 1 child         Tobacco/Alcohol/Supplements:     Last Reviewed on 2/14/2020 09:17 AM by Milena Mancilla    Tobacco: She has never smoked.  Non-drinker     Caffeine:  She admits to consuming caffeine via tea ( 6 servings per day ) and soda ( 1-2 servings per week ).          Substance Abuse History:     Last Reviewed on 2/26/2019 02:59 PM by Vanessa Coon        Mental Health History:     Last Reviewed on 2/26/2019 02:59 PM by Vanessa Coon        Communicable Diseases (eg STDs):     Last Reviewed on 2/26/2019 02:59 PM by Vanessa Coon        Current Problems:     Last Reviewed on 2/26/2019 02:59 PM by Vanessa Coon    Neoplasm of uncertain behavior of left kidney    Renal mass    Allergies    Allergic rhinitis, unspecified    Atopic dermatitis, unspecified    Atopic dermatitis, other    Screening for lipoid disorders    Encounter for screening for lipoid disorders    Acute sinusitis    Encounter for screening for depression    Unspecified lump in right breast, subareolar        Immunizations:     None        Allergies:     Last Reviewed on 4/29/2020 11:02 AM by Ingris Wood    Erythromycin Base:          Current Medications:     Last Reviewed on 4/29/2020 11:02 AM by Ingris Wood    None        Objective:        Exams:     PHYSICAL EXAM:      GENERAL: well developed, well nourished;  no apparent distress;     EYES: extraocular movements intact;     RESPIRATORY: normal respiratory rate and pattern with no distress;     BREAST/INTEGUMENT: SKIN: no significant rashes or lesions; no suspicious moles; area of circular redness noted to right upper back, appears to be dime to quarter sized;     NEUROLOGIC: mental status: alert and oriented x 3;     PSYCHIATRIC: appropriate affect and demeanor;         Assessment:         L02.222   Furuncle of back [any part, except buttock]           ORDERS:         Meds Prescribed:       [New Rx] Bactrim -160 mg oral tablet [take 1 tablet by oral route every 12 hours], #20 (twenty) tablets, Refills: 0 (zero)         Other Orders:         Screening mammogram results documented  (Send-Out)                      Plan:         Furuncle of back [any part, except buttock]Will treat with oral antibiotics and warm compresses. May need to be seen in clinic if no improvement in symptoms.     MIPS Vaccines Flu and Pneumonia updated in Shot record Screening mammomgram done within last 2 years and results in are chart     FOLLOW-UP: for Annual Checkup           Prescriptions:       [New Rx] Bactrim -160 mg oral tablet [take 1 tablet by oral route every 12 hours], #20 (twenty) tablets, Refills: 0 (zero)           Orders:         Screening mammogram results documented  (Send-Out)                  Charge Capture:         Primary Diagnosis:     L02.222  Furuncle of back [any part, except buttock]           Orders:      [id number]  Office/outpatient visit; established patient, level 3  (In-House)

## 2021-05-18 NOTE — PROGRESS NOTES
Honey Vizcarra  1980     Office/Outpatient Visit    Visit Date: Wed, Jan 13, 2021 08:45 am    Provider: Katlyn Jasmine N.P. (Assistant: Edith Patel MA)    Location: [facility]        Electronically signed by Katlyn Jasmine N.P. on  01/18/2021 05:45:16 PM                             Subjective:        CC: Ms. Vizcarra is a 40 year old White female.  This is a follow-up visit.          HPI: Honey is here today with c/o blurry vision. Notes eyes cloudy. Some headache, nausea, tension in between shoulder blades. Some PND, dark green nasal discharge. Benadryl helped some. Taking Claritin daily. Hx allergies. Has not seen eye doctor for about 8 years.     Additionally c/o sharp pain on left side of chest. Intermittent. Seems to happen some with stress. SOA as well during these episodes. Previously complained of palpitations. Holter monitor and ECG normal 8/2020.     Cyst in left breast on mammogram/US last year. Due for repeat.    ROS:     CONSTITUTIONAL:  Negative for chills and fever.      EYES:  Positive for blurred vision.   Negative for eye drainage.      E/N/T:  Positive for nasal congestion, sinus pressure and PND.   Negative for sore throat.      CARDIOVASCULAR:  Positive for chest pain and palpitations ( resolved ).      RESPIRATORY:  Positive for dyspnea.   Negative for frequent wheezing.      NEUROLOGICAL:  Positive for headaches.   Negative for fainting.      PSYCHIATRIC:  Positive for feelings of stress ( (at times) ).   Negative for suicidal thoughts.          Past Medical History / Family History / Social History:         Last Reviewed on 8/19/2020 12:04 PM by Katlyn Jasmine    Past Medical History:                 PAST MEDICAL HISTORY         Interstitial cystitis     Iron Deficiency Anemia: with pregnancy;     Allergies: seasonal;         CURRENT MEDICAL PROVIDERS:    Dermatologist    Dr Noble , at [facility]    surgeon dr goldsmith         PREVENTIVE HEALTH  MAINTENANCE             MAMMOGRAM: Done within last 2 years and results in are chart was last done 2/18/20 The next one is due  2/19/21 US small cyst right breast     PAP SMEAR: was last done 9/2019 with normal results Dr Campbell         Surgical History:         Breast Augmentation - below the muscle; 5-23-12.  Other Surgeries    had a cut on arm and then had a scar revision;     Hernia Repair: 10-23-15 and 2-18-16; umbilical; 11-26-19 ventral hernia repair; Other Surgeries    GYN reconstructive sx 3-2017;         Family History:     Father: Cirrhosis ( Alcohol )     Mother: Hypertension; Peripheral Vascular Disease ( (smoker) )     Paternal uncle - bone CA         Social History:     Occupation: Homemaker     Marital Status:      Children: 2 children         Tobacco/Alcohol/Supplements:     Last Reviewed on 1/13/2021 08:50 AM by Edith Patel    Tobacco: She has never smoked.          Alcohol: Frequency: Socially         Substance Abuse History:     Last Reviewed on 2/26/2019 02:59 PM by Vanessa Coon        Mental Health History:     Last Reviewed on 2/26/2019 02:59 PM by Vanessa Coon        Communicable Diseases (eg STDs):     Last Reviewed on 2/26/2019 02:59 PM by Vanessa Coon        Current Problems:     Last Reviewed on 2/26/2019 02:59 PM by Vanessa Coon    Neoplasm of uncertain behavior of left kidney    Renal mass    Allergic rhinitis, unspecified    Atopic dermatitis, unspecified    Encounter for screening for lipoid disorders    Encounter for screening for depression    Unspecified lump in right breast, subareolar    Furuncle of back [any part, except buttock]    Neoplasm of uncertain behavior of skin    Squamous cell carcinoma of skin of other part of trunk    Chest pain, unspecified    Encounter for general adult medical examination without abnormal findings    Pain in unspecified joint        Immunizations:     Tdap 5/24/2018        Allergies:     Last Reviewed on  1/13/2021 08:50 AM by Edith Patel    Erythromycin Base:          Current Medications:     Last Reviewed on 1/13/2021 08:50 AM by Edith Patel    None        Objective:        Vitals:         Historical:     8/19/2020  BP:   105/55 mm Hg ( (left arm, , sitting, );) 8/19/2020  HR:   85bpm8/19/2020  Wt:   135.1lbs    Current: 1/13/2021 8:50:04 AM    Ht:  5 ft, 4.5 in;  Wt: 136.6 lbs;  BMI: 23.1T: 97.5 F (temporal);  BP: 112/67 mm Hg (left arm, sitting);  P: 85 bpm (left arm (BP Cuff), sitting);  sCr: 0.83 mg/dL;  GFR: 85.98        Exams:     PHYSICAL EXAM:     GENERAL: well developed, well nourished;  no apparent distress;     EYES: PERRL, EOMI     E/N/T: EARS: external auditory canal normal;  both TMs are dull;  NOSE: normal turbinates; bilateral maxillary and bilateral frontal sinus tenderness present; OROPHARYNX: oral mucosa is normal; posterior pharynx shows no exudate and PND;     NECK: range of motion is normal; trachea is midline;     RESPIRATORY: normal appearance and symmetric expansion of chest wall; normal respiratory rate and pattern with no distress; normal breath sounds with no rales, rhonchi, wheezes or rubs;     CARDIOVASCULAR: normal rate; rhythm is regular;     MUSCULOSKELETAL: normal gait;     NEUROLOGIC: mental status: alert and oriented x 3; GROSSLY INTACT     PSYCHIATRIC: appropriate affect and demeanor;         Assessment:         J01.90   Acute sinusitis, unspecified       Z12.31   Encounter for screening mammogram for malignant neoplasm of breast       R07.9   Chest pain, unspecified           ORDERS:         Meds Prescribed:       [New Rx] Augmentin 875-125 mg oral tablet [take 1 tablet by oral route every 12 hours for 10 days], #20 (twenty) tablets, Refills: 0 (zero)         Radiology/Test Orders:       [id number]  Screening mammography bi 2-view inc CAD  (Send-Out)              Other Orders:         Screening mammogram results documented  (Send-Out)                      Plan:          Acute sinusitis, unspecifiedWill treat for sinusitis. Hold off on Claritin for now. Advised nasal saline spray. Follow up with eye doctor. Consider CT head/sinuses and/or repeat labs if no improvement in symptoms.        RECOMMENDATIONS given include: Push Fluids, Rest, Follow up if no improvement or worsening symptoms like high fevers, vomiting, weakness, or increasing shortness of air.    .  MIPS Vaccines Flu and Pneumonia updated in Shot record Screening mammomgram done within last 2 years and results in are chart     FOLLOW-UP: Chronic visit follow up           Prescriptions:       [New Rx] Augmentin 875-125 mg oral tablet [take 1 tablet by oral route every 12 hours for 10 days], #20 (twenty) tablets, Refills: 0 (zero)           Orders:         Screening mammogram results documented  (Send-Out)              Encounter for screening mammogram for malignant neoplasm of breastPatient given order and number to call to schedule.         RADIOLOGY:  I have ordered Mammogram Screening to be done today.            Orders:       [id number]  Screening mammography bi 2-view inc CAD  (Send-Out)              Chest pain, unspecifiedLikely related to stress. Will see how she feels after antibiotic treatment and may need additional cardiac workup as warranted.             Charge Capture:         Primary Diagnosis:     J01.90  Acute sinusitis, unspecified           Orders:      [id number]  Office/outpatient visit; established patient, level 4  (In-House)              Z12.31  Encounter for screening mammogram for malignant neoplasm of breast     R07.9  Chest pain, unspecified

## 2021-05-18 NOTE — PROGRESS NOTES
Honey Bishop 1980     Office/Outpatient Visit    Visit Date: Tue, Feb 26, 2019 02:38 pm    Provider: Vanessa Coon N.P. (Assistant: Sulaiman Larose)    Location: [facility]        Electronically signed by Vanessa Coon N.P. on  03/02/2019 01:44:31 PM                             SUBJECTIVE:        CC:     Ms. Bishop is a 39 year old White female.  presents today due to nausea and vomiting, 9 weeks pregnant         HPI:         Nausea: Pt states being nauseated in the last few days. She thought it was odd due to not having any nausea with her last pregnancy.          With regard to the cough, pt states dry cough with some sinus pressure x 2-3 days. Has had exposure to flu. No meds taken.      ROS:     CONSTITUTIONAL:  Negative for chills, fatigue, fever, and weight change.      CARDIOVASCULAR:  Negative for chest pain, palpitations, tachycardia, orthopnea, and edema.      RESPIRATORY:  Positive for recent cough.      GASTROINTESTINAL:  Positive for nausea.      MUSCULOSKELETAL:  Negative for arthralgias, back pain, and myalgias.      NEUROLOGICAL:  Negative for dizziness, headaches, paresthesias, and weakness.      PSYCHIATRIC:  Negative for anxiety, depression, and sleep disturbances.          PM/FM/SH:     Last Reviewed on 2/26/2019 02:59 PM by Vanessa Coon    Past Medical History:                 PAST MEDICAL HISTORY         interstiial cystitis - Dr. Mcdowell managing     Iron Deficiency Anemia: with pregnancy;     Allergies: seasonal;     has had folliculitis         GYNECOLOGICAL HISTORY:    Not currently using any form of contraception.          CURRENT MEDICAL PROVIDERS:    Dr Noble , at [facility]    surgeon dr goldsmith         Surgical History:         Breast Augmentation - below the muscle; 5-23-12.      had a cut on arm and then had a scar revision; Procedures: mri of thr right shoulder , arthrogram and xrays of the shoulder, due to a job injury  "    Hernia Repair: 10-23-15 and 2-18-16; umbilical;      GYN sx 3-2017;         Family History:     Father: Hypertension; Hyperlipidemia     Mother: Hypertension     Sister(s): Healthy; 1 sister(s) total     Daughter(s): 1 daughter(s) total         Social History:     Occupation: Unemployed     Marital Status:      Children: 1 child         Tobacco/Alcohol/Supplements:     Last Reviewed on 2/26/2019 02:59 PM by Vanessa Coon    Tobacco: She has never smoked.  Non-drinker     Caffeine:  She admits to consuming caffeine via tea ( 6 servings per day ) and soda ( 1-2 servings per week ).          Substance Abuse History:     Last Reviewed on 2/26/2019 02:59 PM by Vanessa Coon        Mental Health History:     Last Reviewed on 2/26/2019 02:59 PM by Vanessa Coon        Communicable Diseases (eg STDs):     Last Reviewed on 2/26/2019 02:59 PM by Vanessa Coon            Current Problems:     Last Reviewed on 2/26/2019 02:59 PM by Vanessa Coon    Screening for lipoid disorders     Atopic dermatitis, other     Allergies     Renal mass     \"Worried well\"     Screening for cardiovascular conditions         Immunizations:     None        Allergies:     Last Reviewed on 2/26/2019 02:59 PM by Vanessa Coon    Erythromycin Base:        Current Medications:     Last Reviewed on 2/26/2019 02:59 PM by Vanessa Coon    Prenatal Multivitamin Capsules         OBJECTIVE:        Vitals:         Current: 2/26/2019 2:46:22 PM    Ht:  5 ft, 4.5 in;  Wt: 151.2 lbs;  BMI: 25.6    T: 98.8 F (oral);  BP: 116/68 mm Hg (right arm, sitting);  P: 98 bpm (right arm (BP Cuff), sitting);  sCr: 0.68 mg/dL;  GFR: 110.64        Exams:     PHYSICAL EXAM:     GENERAL:  well developed and nourished; appropriately groomed; in no apparent distress;     E/N/T: NOSE: nasal mucosa is erythematous;     RESPIRATORY:  lungs clear to auscultation and percussion; symmetric expansion; no dyspnea;     CARDIOVASCULAR: regular rate " and rhythm; normal S1, S2; no murmur, rub, or gallop; normal PMI;     GASTROINTESTINAL: nontender, nondistended; no hepatosplenomegaly or masses; no bruits;     MUSCULOSKELETAL:  Normal range of motion, strength and tone;     NEUROLOGICAL:  cranial nerves, motor and sensory function, reflexes, gait and coordination are all intact;     PSYCHIATRIC:  appropriate affect and demeanor; normal speech pattern; grossly normal memory;         Lab/Test Results:             Performed by::  tls (02/26/2019),     Influenza A and B:  Negative (02/26/2019),             ASSESSMENT           646.93   R11.0  Nausea              DDx:     465.8   J06.9  URI              DDx:         ORDERS:         Meds Prescribed:       Ondansetron HCl 4mg Oral Soluble Film 1 po q 6 hrs prn nausea  #30 (Thirty) film(s) Refills: 1         Lab Orders:       [id number]  Infectious agent antigen detection by immunoassay; Influenza  (In-House)         88589-35  Infectious agent antigen detection by immunoassay; Influenza  (In-House)                   PLAN:          Nausea           Prescriptions:       Ondansetron HCl 4mg Oral Soluble Film 1 po q 6 hrs prn nausea  #30 (Thirty) film(s) Refills: 1          URI Benadryl at night.         TESTS/PROCEDURES:  Will proceed with Flu A&B Flu A Flu B to be performed/scheduled now.            Orders:       [id number]  Infectious agent antigen detection by immunoassay; Influenza  (In-House)         84607-85  Infectious agent antigen detection by immunoassay; Influenza  (In-House)               CHARGE CAPTURE           **Please note: ICD descriptions below are intended for billing purposes only and may not represent clinical diagnoses**        Primary Diagnosis:         646.93 Nausea            R11.0    Nausea              Orders:          [id number]   Office/outpatient visit; established patient, level 3  (In-House)           465.8 URI            J06.9    Acute upper respiratory infection, unspecified              Orders:           [id number]   Infectious agent antigen detection by immunoassay; Influenza  (In-House)             39641 -59  Infectious agent antigen detection by immunoassay; Influenza  (In-House)

## 2021-05-18 NOTE — PROGRESS NOTES
Honey Bishop 1980     Office/Outpatient Visit    Visit Date: Mon, Jul 30, 2018 03:01 pm    Provider: Dirk Montoya MD (Assistant: Sarah Spurling, MA)    Location: [facility]        Electronically signed by Dirk Montoya MD on  07/30/2018 06:25:27 PM                             SUBJECTIVE:        CC:     Ms. Bishop is a 38 year old White female.  presents today due to Possible pink eye (right eye)         HPI:         Patient to be evaluated for pink eye.  Ms. Bishop notes right eye irritation.  Patient denies any eye trauma.  Onset of the described symptoms was gradual over weeks.  Patient denies use of contact lenses.      ROS:     CONSTITUTIONAL:  Negative for chills and fever.      EYES:  Negative for blurred vision.      E/N/T:  Negative for ear pain, nasal congestion and sore throat.      RESPIRATORY:  Negative for recent cough and dyspnea.      GASTROINTESTINAL:  Negative for abdominal pain, nausea and vomiting.      NEUROLOGICAL:  Negative for headaches.          PMH/FM/SH:     Last Reviewed on 7/30/2018 03:16 PM by Dirk Montoya    Past Medical History:                 PAST MEDICAL HISTORY         interstiial cystitis - Dr. Mcdowell managing     Iron Deficiency Anemia: with pregnancy;     Allergies: seasonal;     has had folliculitis         GYNECOLOGICAL HISTORY:    Not currently using any form of contraception.          CURRENT MEDICAL PROVIDERS:    Dr Noble , at [facility]    surgeon dr goldsmith         Surgical History:         Breast Augmentation - below the muscle; 5-23-12.      had a cut on arm and then had a scar revision; Procedures: mri of thr right shoulder , arthrogram and xrays of the shoulder, due to a job injury     Hernia Repair: 10-23-15 and 2-18-16; umbilical;      GYN sx 3-2017;         Family History:     Father: Hypertension; Hyperlipidemia     Mother: Hypertension     Sister(s): Healthy; 1 sister(s) total     Daughter(s): 1  daughter(s) total         Social History:     Occupation: Unemployed     Marital Status:      Children: 1 child         Tobacco/Alcohol/Supplements:     Last Reviewed on 7/30/2018 03:15 PM by Dirk Montoya    Tobacco: She has never smoked.  Non-drinker     Caffeine:  She admits to consuming caffeine via tea ( 6 servings per day ) and soda ( 1-2 servings per week ).          Substance Abuse History:     Last Reviewed on 4/11/2017 12:22 PM by Lesly Jacobsen        Mental Health History:     Last Reviewed on 4/11/2017 12:22 PM by Lesly Jacobsen        Communicable Diseases (eg STDs):     Last Reviewed on 4/11/2017 12:22 PM by Lesly Jacobsen            Current Problems:     Last Reviewed on 7/30/2018 03:18 PM by Dirk Montoya    Atopic dermatitis, other     Allergies     Renal mass         Immunizations:     None        Allergies:     Last Reviewed on 7/30/2018 03:15 PM by Dirk Montoya    Erythromycin Base:        Current Medications:     Last Reviewed on 7/30/2018 03:17 PM by Dirk Montoya      No Known Medications.         OBJECTIVE:        Vitals:         Current: 7/30/2018 3:06:01 PM    Ht:  5 ft, 4.5 in;  Wt: 150 lbs;  BMI: 25.3    T: 98.8 F (oral);  BP: 115/76 mm Hg (left arm, sitting);  P: 79 bpm (left arm (BP Cuff), sitting);  sCr: 0.66 mg/dL;  GFR: 114.71        Exams:     PHYSICAL EXAM:     GENERAL: vital signs recorded - well developed, well nourished;  no apparent distress;     EYES: lids and lacrimal system are normal in appearance; hyperemic right conjunctiva;     E/N/T:  normal EACs, TMs, nasal/oral mucosa, teeth, gingiva, and oropharynx;     RESPIRATORY: normal respiratory rate and pattern with no distress; normal breath sounds with no rales, rhonchi, wheezes or rubs;     CARDIOVASCULAR: normal rate; rhythm is regular;     LYMPHATIC: no enlargement of cervical or facial nodes;     NEUROLOGIC: GROSSLY INTACT         ASSESSMENT           372.01    H10.239  Right Acute conjunctivitis, serous (not viral)              DDx:         ORDERS:         Meds Prescribed:       Polytrim (Polymyxin B/Trimethoprim ) Ophthalmic Solution 2 drop to both eyes tid x 5 days  #10 (Ten) ml Refills: 0                 PLAN:         Right Acute conjunctivitis, serous (not viral)         RECOMMENDATIONS given include: See an eye doctor if not improving after two days.      FOLLOW-UP: Schedule follow-up appointments on a p.r.n. basis.            Prescriptions:       Polytrim (Polymyxin B/Trimethoprim ) Ophthalmic Solution 2 drop to both eyes tid x 5 days  #10 (Ten) ml Refills: 0             Patient Recommendations:        For Right Acute conjunctivitis, serous (not viral):     Schedule follow-up appointments as needed.              CHARGE CAPTURE           **Please note: ICD descriptions below are intended for billing purposes only and may not represent clinical diagnoses**        Primary Diagnosis:         372.01 Right Acute conjunctivitis, serous (not viral)            H10.239    Serous conjunctivitis, except viral, unspecified eye              Orders:          [id number]   Office/outpatient visit; established patient, level 3  (In-House)

## 2021-06-07 ENCOUNTER — TELEPHONE (OUTPATIENT)
Dept: FAMILY MEDICINE CLINIC | Age: 41
End: 2021-06-07

## 2021-06-07 DIAGNOSIS — C44.622 SQUAMOUS CELL CANCER OF SKIN OF RIGHT SHOULDER: Primary | ICD-10-CM

## 2021-06-07 NOTE — TELEPHONE ENCOUNTER
Caller: Honey Vizcarra    Relationship: Self    Best call back number: [phone]    What is the medical concern/diagnosis: SQUAMOUS CELL CARCINOMA    What specialty or service is being requested: DERMATOLOGY    What is the provider, practice or medical service name: [facility]    What is the office location: [address] [place] [address]    What is the office phone number:[phone]    Any additional details: PATIENT STATES THAT SHE NEEDS A REFERRAL TO BE SEEN IN THIS OFFICE. PATIENT STATES THAT SHE HAS AN APPOINTMENT THIS Thursday IN THIS OFFICE BUT IF THE DERMATOLOGIST CAN GET HER IN SOON SHE WOULDN'T NEED THIS APPOINTMENT.

## 2021-07-01 VITALS
WEIGHT: 157.8 LBS | BODY MASS INDEX: 26.29 KG/M2 | SYSTOLIC BLOOD PRESSURE: 136 MMHG | HEART RATE: 96 BPM | TEMPERATURE: 98.5 F | DIASTOLIC BLOOD PRESSURE: 83 MMHG | HEIGHT: 65 IN

## 2021-07-01 VITALS
WEIGHT: 150 LBS | DIASTOLIC BLOOD PRESSURE: 76 MMHG | TEMPERATURE: 98.8 F | SYSTOLIC BLOOD PRESSURE: 115 MMHG | HEART RATE: 79 BPM | BODY MASS INDEX: 24.99 KG/M2 | HEIGHT: 65 IN

## 2021-07-01 VITALS
HEART RATE: 98 BPM | HEIGHT: 65 IN | SYSTOLIC BLOOD PRESSURE: 116 MMHG | TEMPERATURE: 98.8 F | DIASTOLIC BLOOD PRESSURE: 68 MMHG | WEIGHT: 151.2 LBS | BODY MASS INDEX: 25.19 KG/M2

## 2021-07-01 VITALS
HEIGHT: 65 IN | DIASTOLIC BLOOD PRESSURE: 87 MMHG | BODY MASS INDEX: 27.52 KG/M2 | TEMPERATURE: 98.3 F | HEART RATE: 107 BPM | SYSTOLIC BLOOD PRESSURE: 133 MMHG | WEIGHT: 165.2 LBS

## 2021-07-02 VITALS
WEIGHT: 143.2 LBS | SYSTOLIC BLOOD PRESSURE: 114 MMHG | BODY MASS INDEX: 23.86 KG/M2 | TEMPERATURE: 98.6 F | DIASTOLIC BLOOD PRESSURE: 68 MMHG | HEART RATE: 83 BPM | HEIGHT: 65 IN

## 2021-07-02 VITALS
DIASTOLIC BLOOD PRESSURE: 55 MMHG | WEIGHT: 135.1 LBS | BODY MASS INDEX: 22.51 KG/M2 | HEART RATE: 110 BPM | HEIGHT: 65 IN | SYSTOLIC BLOOD PRESSURE: 105 MMHG | TEMPERATURE: 98.5 F

## 2021-07-02 VITALS
DIASTOLIC BLOOD PRESSURE: 67 MMHG | TEMPERATURE: 97.5 F | HEART RATE: 85 BPM | HEIGHT: 65 IN | SYSTOLIC BLOOD PRESSURE: 112 MMHG | WEIGHT: 136.6 LBS | BODY MASS INDEX: 22.76 KG/M2

## 2021-07-02 VITALS
HEIGHT: 65 IN | TEMPERATURE: 98.2 F | SYSTOLIC BLOOD PRESSURE: 122 MMHG | WEIGHT: 150.8 LBS | DIASTOLIC BLOOD PRESSURE: 76 MMHG | BODY MASS INDEX: 25.12 KG/M2 | HEART RATE: 97 BPM

## 2021-07-16 ENCOUNTER — OFFICE VISIT (OUTPATIENT)
Dept: FAMILY MEDICINE CLINIC | Age: 41
End: 2021-07-16

## 2021-07-16 VITALS
HEIGHT: 63 IN | BODY MASS INDEX: 23.04 KG/M2 | OXYGEN SATURATION: 97 % | WEIGHT: 130 LBS | TEMPERATURE: 97.9 F | DIASTOLIC BLOOD PRESSURE: 78 MMHG | HEART RATE: 98 BPM | SYSTOLIC BLOOD PRESSURE: 108 MMHG

## 2021-07-16 DIAGNOSIS — J32.9 SINUSITIS, UNSPECIFIED CHRONICITY, UNSPECIFIED LOCATION: Primary | ICD-10-CM

## 2021-07-16 DIAGNOSIS — C44.529 SQUAMOUS CELL CARCINOMA OF SKIN OF OTHER PART OF TRUNK: ICD-10-CM

## 2021-07-16 PROBLEM — D41.02 NEOPLASM OF UNCERTAIN BEHAVIOR OF LEFT KIDNEY: Status: ACTIVE | Noted: 2021-07-16

## 2021-07-16 PROBLEM — N28.89 RENAL MASS: Status: ACTIVE | Noted: 2021-07-16

## 2021-07-16 PROBLEM — O09.522 ELDERLY MULTIGRAVIDA IN SECOND TRIMESTER: Status: ACTIVE | Noted: 2017-11-08

## 2021-07-16 PROBLEM — N63.41: Status: RESOLVED | Noted: 2021-07-16 | Resolved: 2021-07-16

## 2021-07-16 PROBLEM — N63.41: Status: ACTIVE | Noted: 2021-07-16

## 2021-07-16 PROCEDURE — 96372 THER/PROPH/DIAG INJ SC/IM: CPT | Performed by: NURSE PRACTITIONER

## 2021-07-16 PROCEDURE — 99213 OFFICE O/P EST LOW 20 MIN: CPT | Performed by: NURSE PRACTITIONER

## 2021-07-16 RX ORDER — GUAIFENESIN 600 MG/1
1200 TABLET, EXTENDED RELEASE ORAL 2 TIMES DAILY
COMMUNITY
End: 2021-08-23

## 2021-07-16 NOTE — PROGRESS NOTES
"Chief Complaint  Sinusitis (pressure & congestion (no flu/sars indicators))    Subjective          Honey Vizcarra presents to [facility]    Honey is here today with c/o one week history of sinus congestion and headache. History of allergies. Has been taking Mucinex and OTC cough medication. Also Ibuprofen for headache. Was given Bicillin injection for sinus infection a couple of years ago and would like to receive again.  Additionally, Honey has history of squamous cell carcinoma s/p MOHS procedure last year. Unfortunately, when she went for her follow up appointment, she found out that they no longer take her insurance. She has called around and found that [facility] in [facility] accepts her insurance.  She needs referral placed to see them.        Objective   Vital Signs:   /78   Pulse 98   Temp 97.9 °F (36.6 °C)   Ht 160 cm (63\")   Wt 59 kg (130 lb)   SpO2 97%   BMI 23.03 kg/m²     Physical Exam  Vitals reviewed.   Constitutional:       General: She is not in acute distress.     Appearance: Normal appearance. She is well-developed.   HENT:      Head: Normocephalic and atraumatic.      Right Ear: Tympanic membrane and ear canal normal.      Left Ear: Tympanic membrane and ear canal normal.      Nose: Congestion present.      Right Sinus: Maxillary sinus tenderness and frontal sinus tenderness present.      Left Sinus: Maxillary sinus tenderness and frontal sinus tenderness present.      Mouth/Throat:      Mouth: Mucous membranes are moist.   Eyes:      Extraocular Movements: Extraocular movements intact.   Cardiovascular:      Rate and Rhythm: Normal rate and regular rhythm.   Pulmonary:      Effort: Pulmonary effort is normal.      Breath sounds: Normal breath sounds.   Neurological:      Mental Status: She is alert and oriented to person, place, and time.   Psychiatric:         Mood and Affect: Mood and affect normal.          Result Review :     RA " Profile II (08/19/2020 12:50)  Physical [facility] Panel (08/19/2020 12:50)           Assessment and Plan    Diagnoses and all orders for this visit:    1. Sinusitis, unspecified chronicity, unspecified location (Primary)  Comments:  Drink plenty of decaffeinated fluids  Orders:  -     Discontinue: penicillin G benzathine (BICILLIN-LA) injection 1.2 Million Units  -     penicillin G benzathine (BICILLIN-LA) injection 1.2 Million Units    2. Squamous cell carcinoma of skin of other part of trunk  Comments:  Will place referral for follow-up with dermatology  Orders:  -     Ambulatory Referral to Dermatology        Follow Up    Return for Annual physical.  Patient was given instructions and counseling regarding her condition or for health maintenance advice. Please see specific information pulled into the AVS if appropriate.

## 2021-08-23 ENCOUNTER — OFFICE VISIT (OUTPATIENT)
Dept: FAMILY MEDICINE CLINIC | Age: 41
End: 2021-08-23

## 2021-08-23 ENCOUNTER — LAB (OUTPATIENT)
Dept: LAB | Facility: HOSPITAL | Age: 41
End: 2021-08-23

## 2021-08-23 VITALS
HEART RATE: 82 BPM | SYSTOLIC BLOOD PRESSURE: 117 MMHG | BODY MASS INDEX: 22.71 KG/M2 | WEIGHT: 128.2 LBS | TEMPERATURE: 98.9 F | HEIGHT: 63 IN | DIASTOLIC BLOOD PRESSURE: 68 MMHG

## 2021-08-23 DIAGNOSIS — Z11.59 SCREENING FOR VIRAL DISEASE: ICD-10-CM

## 2021-08-23 DIAGNOSIS — Z00.00 ANNUAL PHYSICAL EXAM: ICD-10-CM

## 2021-08-23 DIAGNOSIS — N92.6 IRREGULAR PERIODS/MENSTRUAL CYCLES: ICD-10-CM

## 2021-08-23 DIAGNOSIS — C44.529 SQUAMOUS CELL CARCINOMA OF SKIN OF OTHER PART OF TRUNK: ICD-10-CM

## 2021-08-23 DIAGNOSIS — Z00.00 ANNUAL PHYSICAL EXAM: Primary | ICD-10-CM

## 2021-08-23 LAB
ALBUMIN SERPL-MCNC: 4.5 G/DL (ref 3.5–5.2)
ALBUMIN/GLOB SERPL: 1.6 G/DL
ALP SERPL-CCNC: 90 U/L (ref 39–117)
ALT SERPL W P-5'-P-CCNC: 11 U/L (ref 1–33)
ANION GAP SERPL CALCULATED.3IONS-SCNC: 9.1 MMOL/L (ref 5–15)
AST SERPL-CCNC: 17 U/L (ref 1–32)
BASOPHILS # BLD AUTO: 0.08 10*3/MM3 (ref 0–0.2)
BASOPHILS NFR BLD AUTO: 1.1 % (ref 0–1.5)
BILIRUB SERPL-MCNC: 0.2 MG/DL (ref 0–1.2)
BUN SERPL-MCNC: 6 MG/DL (ref 6–20)
BUN/CREAT SERPL: 8.2 (ref 7–25)
CALCIUM SPEC-SCNC: 9 MG/DL (ref 8.6–10.5)
CHLORIDE SERPL-SCNC: 102 MMOL/L (ref 98–107)
CHOLEST SERPL-MCNC: 183 MG/DL (ref 0–200)
CO2 SERPL-SCNC: 24.9 MMOL/L (ref 22–29)
CREAT SERPL-MCNC: 0.73 MG/DL (ref 0.57–1)
DEPRECATED RDW RBC AUTO: 43.9 FL (ref 37–54)
EOSINOPHIL # BLD AUTO: 0.25 10*3/MM3 (ref 0–0.4)
EOSINOPHIL NFR BLD AUTO: 3.5 % (ref 0.3–6.2)
ERYTHROCYTE [DISTWIDTH] IN BLOOD BY AUTOMATED COUNT: 13.5 % (ref 12.3–15.4)
GFR SERPL CREATININE-BSD FRML MDRD: 88 ML/MIN/1.73
GLOBULIN UR ELPH-MCNC: 2.9 GM/DL
GLUCOSE SERPL-MCNC: 89 MG/DL (ref 65–99)
HCT VFR BLD AUTO: 36 % (ref 34–46.6)
HCV AB SER DONR QL: NORMAL
HDLC SERPL-MCNC: 57 MG/DL (ref 40–60)
HGB BLD-MCNC: 11.7 G/DL (ref 12–15.9)
IMM GRANULOCYTES # BLD AUTO: 0.01 10*3/MM3 (ref 0–0.05)
IMM GRANULOCYTES NFR BLD AUTO: 0.1 % (ref 0–0.5)
LDLC SERPL CALC-MCNC: 114 MG/DL (ref 0–100)
LDLC/HDLC SERPL: 1.98 {RATIO}
LYMPHOCYTES # BLD AUTO: 1.46 10*3/MM3 (ref 0.7–3.1)
LYMPHOCYTES NFR BLD AUTO: 20.6 % (ref 19.6–45.3)
MCH RBC QN AUTO: 28.5 PG (ref 26.6–33)
MCHC RBC AUTO-ENTMCNC: 32.5 G/DL (ref 31.5–35.7)
MCV RBC AUTO: 87.8 FL (ref 79–97)
MONOCYTES # BLD AUTO: 0.53 10*3/MM3 (ref 0.1–0.9)
MONOCYTES NFR BLD AUTO: 7.5 % (ref 5–12)
NEUTROPHILS NFR BLD AUTO: 4.76 10*3/MM3 (ref 1.7–7)
NEUTROPHILS NFR BLD AUTO: 67.2 % (ref 42.7–76)
PLATELET # BLD AUTO: 301 10*3/MM3 (ref 140–450)
PMV BLD AUTO: 9.4 FL (ref 6–12)
POTASSIUM SERPL-SCNC: 3.6 MMOL/L (ref 3.5–5.2)
PROT SERPL-MCNC: 7.4 G/DL (ref 6–8.5)
RBC # BLD AUTO: 4.1 10*6/MM3 (ref 3.77–5.28)
SODIUM SERPL-SCNC: 136 MMOL/L (ref 136–145)
TRIGL SERPL-MCNC: 65 MG/DL (ref 0–150)
TSH SERPL DL<=0.05 MIU/L-ACNC: 1.27 UIU/ML (ref 0.27–4.2)
VLDLC SERPL-MCNC: 12 MG/DL (ref 5–40)
WBC # BLD AUTO: 7.09 10*3/MM3 (ref 3.4–10.8)

## 2021-08-23 PROCEDURE — 99396 PREV VISIT EST AGE 40-64: CPT | Performed by: NURSE PRACTITIONER

## 2021-08-23 PROCEDURE — 85025 COMPLETE CBC W/AUTO DIFF WBC: CPT

## 2021-08-23 PROCEDURE — 84443 ASSAY THYROID STIM HORMONE: CPT

## 2021-08-23 PROCEDURE — 80053 COMPREHEN METABOLIC PANEL: CPT

## 2021-08-23 PROCEDURE — 80061 LIPID PANEL: CPT

## 2021-08-23 PROCEDURE — 86803 HEPATITIS C AB TEST: CPT

## 2021-08-23 PROCEDURE — 36415 COLL VENOUS BLD VENIPUNCTURE: CPT

## 2021-08-23 NOTE — PROGRESS NOTES
"Chief Complaint  Annual Exam    Subjective          Honey Vizcarra presents to [facility]    Honey is here for annual preventive exam today.   Mammogram UTD 3/10/21 normal.   Pap smear 9/2019 with Dr Campbell. Reports irregular periods since birth of children. Heavy and lasting longer than previous.    UTD Tdap 5/24/18. Declines influenza, covid vaccine.   Hx squamous cell carcinoma s/p MOHS procedure 2020. She has still not heard about her dermatology referral appt.  History of interstitial cystitis. Managed by diet. No longer on medications.       Review of Systems   Constitutional: Negative for chills and fever.   HENT: Negative for ear pain and sore throat.    Eyes: Negative for photophobia and visual disturbance.   Respiratory: Negative for cough and shortness of breath.    Cardiovascular: Negative for chest pain and palpitations.   Gastrointestinal: Negative for abdominal pain and rectal pain.   Genitourinary: Positive for menstrual problem.   Skin: Negative for rash.   Neurological: Negative for seizures and syncope.   Psychiatric/Behavioral: Negative for hallucinations and suicidal ideas.        Objective   Vital Signs:   /68 (BP Location: Left arm, Patient Position: Sitting, Cuff Size: Large Adult)   Pulse 82   Temp 98.9 °F (37.2 °C) (Oral)   Ht 160 cm (62.99\")   Wt 58.2 kg (128 lb 3.2 oz)   BMI 22.72 kg/m²       Physical Exam  Vitals reviewed.   Constitutional:       General: She is not in acute distress.     Appearance: Normal appearance. She is well-developed.   HENT:      Head: Normocephalic and atraumatic.      Right Ear: Hearing, tympanic membrane and ear canal normal.      Left Ear: Hearing, tympanic membrane and ear canal normal.      Mouth/Throat:      Mouth: Mucous membranes are moist.   Eyes:      Extraocular Movements: Extraocular movements intact.      Pupils: Pupils are equal, round, and reactive to light.   Cardiovascular:      Rate and Rhythm: " Normal rate and regular rhythm.      Pulses: Normal pulses.      Heart sounds: Normal heart sounds.   Pulmonary:      Effort: Pulmonary effort is normal.      Breath sounds: Normal breath sounds.   Abdominal:      General: Bowel sounds are normal.      Palpations: Abdomen is soft.      Tenderness: There is no abdominal tenderness.   Musculoskeletal:         General: Normal range of motion.      Cervical back: Normal range of motion and neck supple.   Skin:     General: Skin is warm and dry.   Neurological:      Mental Status: She is alert and oriented to person, place, and time.   Psychiatric:         Mood and Affect: Mood normal.          Result Review :   The following data was reviewed by: PARK Landry on 08/23/2021:  RA Profile II (08/19/2020 12:50)  Physical [facility] Panel (08/19/2020 12:50)           Assessment and Plan    Diagnoses and all orders for this visit:    1. Annual physical exam (Primary)  -     CBC Auto Differential; Future  -     Comprehensive Metabolic Panel; Future  -     Lipid Panel; Future  -     TSH; Future    2. Screening for viral disease  -     Hepatitis C Antibody; Future    3. Squamous cell carcinoma of skin of other part of trunk  Comments:  Will have referrals check on dermatology referral     4. Irregular periods/menstrual cycles  Comments:  Checking labs as above. Consider US. Due for pap smear so follow up with GYN advised as well.         Follow Up    Return in about 1 year (around 8/23/2022) for Annual physical.  Patient was given instructions and counseling regarding her condition or for health maintenance advice. Please see specific information pulled into the AVS if appropriate.

## 2021-11-01 ENCOUNTER — TELEMEDICINE (OUTPATIENT)
Dept: FAMILY MEDICINE CLINIC | Age: 41
End: 2021-11-01

## 2021-11-01 DIAGNOSIS — J01.01 ACUTE RECURRENT MAXILLARY SINUSITIS: Primary | ICD-10-CM

## 2021-11-01 DIAGNOSIS — J30.2 SEASONAL ALLERGIC RHINITIS, UNSPECIFIED TRIGGER: ICD-10-CM

## 2021-11-01 PROBLEM — O09.522 ELDERLY MULTIGRAVIDA IN SECOND TRIMESTER: Status: RESOLVED | Noted: 2017-11-08 | Resolved: 2021-11-01

## 2021-11-01 PROCEDURE — 99213 OFFICE O/P EST LOW 20 MIN: CPT | Performed by: NURSE PRACTITIONER

## 2021-11-01 RX ORDER — AMOXICILLIN AND CLAVULANATE POTASSIUM 875; 125 MG/1; MG/1
1 TABLET, FILM COATED ORAL 2 TIMES DAILY
Qty: 14 TABLET | Refills: 0 | Status: SHIPPED | OUTPATIENT
Start: 2021-11-01 | End: 2021-12-09

## 2021-11-01 NOTE — PROGRESS NOTES
Chief Complaint  Honey Vizcarra presents to [facility] for No chief complaint on file.    Subjective            Mode of Visit: Video  You have chosen to receive care through a telehealth visit.  Do you consent to use a video/audio connection for your medical care today? YES   Identity has been verified with 2 identifiers - (name and date of birth).    The visit included audio and video interaction. Patient is currently located : home and provider located :  office   No technical issues occurred during this visit.         Patient here today for concerns of URI    Known Exposure to positive case?   no  Date of exposure?   n/a  Date of symptoms start?   2-3  (Symptoms may appear 2-14 days after exposure )    Fever or chills?   yes  Cough?   yes  Shortness of breath or difficulty breathing?  yes  Fatigue?   no  Muscle or body aches?  no   Headache?   no  New loss of taste or smell? no  Sore throat?  no  Congestion or runny nose? yes  Nausea or vomiting?   no  Diarrhea?   no  Any  emergency warning signs for COVID-19.   Trouble breathing?  no  Persistent pain or pressure in the chest?   no  New confusion? no  Inability to wake or stay awake?no  Pale, gray, or blue-colored skin, lips, or nail beds, depending on skin tone?   no    Taking any medications at home to help with symptoms?yes  zyrtec / benadryl  Any prior vaccine to covid?   no  Any significant health problems / existing lung / heart problems?  No  Chronic allergies             Review of Systems      Allergies   Allergen Reactions   • Erythromycin Palpitations     HEART RACING   • Erythromycin Base Palpitations   • Nuts Hives and Itching     Cashews; itching in throat   • Shellfish-Derived Products Itching and Rash     Itching throat       Past Medical History:   Diagnosis Date   • Allergic rhinitis, unspecified    • Atopic dermatitis, unspecified    • Interstitial cystitis    • Iron deficiency anemia of pregnancy    • Neoplasm  of uncertain behavior of skin    • Squamous cell carcinoma of skin of other part of trunk      No current outpatient medications on file.     No current facility-administered medications for this visit.     Past Surgical History:   Procedure Laterality Date   • BLADDER EXTROPHY RECONSTRUCTION PELVIC SAGITTAL OSTEOTOMY  03/2017    GYN reconstructive sx   • BREAST AUGMENTATION  05/23/2012    below the muscle   • BREAST SURGERY  5/23/2013   • HERNIA REPAIR  10/023/15, 2/18/16    10/023/15, 2/18/16; umbilical; 11/26/19 ventral hernia repair   • SCAR REVISION ARM      had a cut on arm and then had scar revision      Social History     Tobacco Use   • Smoking status: Never Smoker   • Smokeless tobacco: Never Used   Substance Use Topics   • Alcohol use: Yes     Comment: Socially   • Drug use: Never     Family History   Problem Relation Age of Onset   • Peripheral vascular disease Mother         smoker   • Alcohol abuse Mother    • Cirrhosis Father         alcohol   • Alcohol abuse Father    • Liver disease Father    • Bone cancer Paternal Uncle      Health Maintenance Due   Topic Date Due   • INFLUENZA VACCINE  Never done      Immunization History   Administered Date(s) Administered   • Tdap 05/24/2018        Objective     There were no vitals filed for this visit.  There is no height or weight on file to calculate BMI.     Physical Exam  Constitutional:       Appearance: Normal appearance. She is ill-appearing (minimal).   HENT:      Head: Normocephalic.   Musculoskeletal:         General: Normal range of motion.      Cervical back: Normal range of motion.   Neurological:      General: No focal deficit present.      Mental Status: She is alert and oriented to person, place, and time.   Psychiatric:         Mood and Affect: Mood normal.         Thought Content: Thought content normal.           Result Review :                               Assessment and Plan      Diagnoses and all orders for this visit:    1. Acute  recurrent maxillary sinusitis (Primary)  Comments:  saline spray, mucinex or allegra D for 5 days  follow up if new or worsening symptoms    2. Seasonal allergic rhinitis, unspecified trigger  Comments:  continue daily medication, consider flonase/ saline nasal spray daily               Follow Up     No follow-ups on file.

## 2021-11-08 ENCOUNTER — OFFICE VISIT (OUTPATIENT)
Dept: FAMILY MEDICINE CLINIC | Age: 41
End: 2021-11-08

## 2021-11-08 VITALS
BODY MASS INDEX: 23.21 KG/M2 | OXYGEN SATURATION: 98 % | WEIGHT: 131 LBS | DIASTOLIC BLOOD PRESSURE: 71 MMHG | HEART RATE: 79 BPM | SYSTOLIC BLOOD PRESSURE: 108 MMHG | TEMPERATURE: 98.6 F

## 2021-11-08 DIAGNOSIS — R10.11 RUQ ABDOMINAL PAIN: Primary | ICD-10-CM

## 2021-11-08 PROCEDURE — 99214 OFFICE O/P EST MOD 30 MIN: CPT | Performed by: NURSE PRACTITIONER

## 2021-11-08 RX ORDER — CETIRIZINE HYDROCHLORIDE 10 MG/1
10 TABLET ORAL DAILY
COMMUNITY
End: 2021-12-09

## 2021-11-08 NOTE — PROGRESS NOTES
Chief Complaint  Honey Vizcarra presents to [facility] for Abdominal Pain (URQ) and Nausea    Subjective          History of Present Illness    Pt is here today with c/o right sided abdominal pain. Started about 8 months ago. She thought it might have been a pulled muscle at first but has lingered and occurred intermittently. She has felt a knot. She has had some nausea. Pain is typically in the mornings. Describes as achy. She notes that her diet has not been the best. She has not had any heartburn. Some right shoulder pain earlier today that she describes as gas pain. She has taken Tylenol and Ibuprofen for symptoms but it typically seems to resolve on its own. LMP last week.     Review of Systems      Allergies   Allergen Reactions   • Erythromycin Palpitations and Arrhythmia   • Erythromycin Base Palpitations   • Nuts Hives and Itching     Cashews; itching in throat   • Shellfish-Derived Products Itching and Rash     Itching throat       Past Medical History:   Diagnosis Date   • Allergic rhinitis, unspecified    • Atopic dermatitis, unspecified    • Interstitial cystitis    • Iron deficiency anemia of pregnancy    • Neoplasm of uncertain behavior of skin    • Squamous cell carcinoma of skin of other part of trunk      Current Outpatient Medications   Medication Sig Dispense Refill   • amoxicillin-clavulanate (Augmentin) 875-125 MG per tablet Take 1 tablet by mouth 2 (Two) Times a Day. 14 tablet 0   • cetirizine (zyrTEC) 10 MG tablet Take 10 mg by mouth Daily.       No current facility-administered medications for this visit.     Past Surgical History:   Procedure Laterality Date   • BLADDER EXTROPHY RECONSTRUCTION PELVIC SAGITTAL OSTEOTOMY  03/2017    GYN reconstructive sx   • BREAST AUGMENTATION  05/23/2012    below the muscle   • BREAST SURGERY  5/23/2013   • HERNIA REPAIR  10/023/15, 2/18/16    10/023/15, 2/18/16; umbilical; 11/26/19 ventral hernia repair   • SCAR REVISION  ARM      had a cut on arm and then had scar revision      Social History     Tobacco Use   • Smoking status: Never Smoker   • Smokeless tobacco: Never Used   Vaping Use   • Vaping Use: Never used   Substance Use Topics   • Alcohol use: Yes     Comment: Socially   • Drug use: Never     Family History   Problem Relation Age of Onset   • Peripheral vascular disease Mother         smoker   • Alcohol abuse Mother    • Cirrhosis Father         alcohol   • Alcohol abuse Father    • Liver disease Father    • Bone cancer Paternal Uncle      There are no preventive care reminders to display for this patient.   Immunization History   Administered Date(s) Administered   • Tdap 05/24/2018        Objective     Vitals:    11/08/21 1426   BP: 108/71   Pulse: 79   Temp: 98.6 °F (37 °C)   SpO2: 98%   Weight: 59.4 kg (131 lb)     Body mass index is 23.21 kg/m².     Physical Exam  Vitals reviewed.   Constitutional:       General: She is not in acute distress.     Appearance: Normal appearance. She is well-developed.   HENT:      Head: Normocephalic and atraumatic.   Cardiovascular:      Rate and Rhythm: Normal rate and regular rhythm.   Pulmonary:      Effort: Pulmonary effort is normal.      Breath sounds: Normal breath sounds.   Abdominal:      General: Bowel sounds are normal.      Palpations: Abdomen is soft.      Tenderness: There is abdominal tenderness (mild RUQ).   Neurological:      Mental Status: She is alert and oriented to person, place, and time.   Psychiatric:         Mood and Affect: Mood and affect normal.           Result Review :                               Assessment and Plan      Diagnoses and all orders for this visit:    1. RUQ abdominal pain (Primary)  Comments:  Advised bland diet. Will proceed with RUQ US to further evaluate the gallbladder/liver.   Orders:  -     US Abdomen Limited; Future              Follow Up     Return for Annual physical.

## 2021-11-18 ENCOUNTER — HOSPITAL ENCOUNTER (OUTPATIENT)
Dept: ULTRASOUND IMAGING | Facility: HOSPITAL | Age: 41
Discharge: HOME | End: 2021-11-18
Admitting: NURSE PRACTITIONER

## 2021-11-18 DIAGNOSIS — R10.11 RUQ ABDOMINAL PAIN: ICD-10-CM

## 2021-11-18 PROCEDURE — 76705 ECHO EXAM OF ABDOMEN: CPT

## 2021-11-19 DIAGNOSIS — R10.11 RUQ ABDOMINAL PAIN: Primary | ICD-10-CM

## 2021-12-03 ENCOUNTER — HOSPITAL ENCOUNTER (OUTPATIENT)
Dept: CT IMAGING | Facility: HOSPITAL | Age: 41
Discharge: HOME | End: 2021-12-03
Admitting: NURSE PRACTITIONER

## 2021-12-03 DIAGNOSIS — R10.11 RUQ ABDOMINAL PAIN: ICD-10-CM

## 2021-12-03 PROCEDURE — 74177 CT ABD & PELVIS W/CONTRAST: CPT

## 2021-12-03 PROCEDURE — 0 IOPAMIDOL PER 1 ML: Performed by: NURSE PRACTITIONER

## 2021-12-03 RX ADMIN — IOPAMIDOL 100 ML: 755 INJECTION, SOLUTION INTRAVENOUS at 10:30

## 2021-12-09 ENCOUNTER — TELEMEDICINE (OUTPATIENT)
Dept: FAMILY MEDICINE CLINIC | Age: 41
End: 2021-12-09

## 2021-12-09 DIAGNOSIS — N20.0 RENAL CALCULUS OR STONE: ICD-10-CM

## 2021-12-09 DIAGNOSIS — N94.89 PELVIC CONGESTION SYNDROME: ICD-10-CM

## 2021-12-09 DIAGNOSIS — K21.9 GASTROESOPHAGEAL REFLUX DISEASE WITHOUT ESOPHAGITIS: Primary | ICD-10-CM

## 2021-12-09 PROCEDURE — 99214 OFFICE O/P EST MOD 30 MIN: CPT | Performed by: NURSE PRACTITIONER

## 2021-12-09 RX ORDER — TAMSULOSIN HYDROCHLORIDE 0.4 MG/1
1 CAPSULE ORAL DAILY
Qty: 30 CAPSULE | Refills: 0 | Status: SHIPPED | OUTPATIENT
Start: 2021-12-09 | End: 2022-10-06

## 2021-12-09 RX ORDER — OMEPRAZOLE 40 MG/1
40 CAPSULE, DELAYED RELEASE ORAL DAILY
Qty: 30 CAPSULE | Refills: 1 | Status: SHIPPED | OUTPATIENT
Start: 2021-12-09 | End: 2022-03-10

## 2021-12-09 NOTE — PROGRESS NOTES
Chief Complaint  Honey Vizcarra presents to [facility] for Abdominal Pain (discuss radiology results) and dox video ([phone])    Subjective          History of Present Illness     Today's encounter is being done with a telehealth visit. She has consented verbally with two witnesses for todays treatment. Todays visit is being conducted by audio and video. Individuals present during the telemedicine consultation include Katherine Goss MA. Pt is at her house during visit and provider in clinic.     Honey was experiencing some abdominal symptoms including RUQ discomfort and nausea. RUQ US on 11/8/2021 was normal. CT abdomen/pelvis w contrast performed on 12/3/2021. Showed nonobstructing 4 mg right lower pole renal calculus. Also significant prominence of the pelvic venous structures including a dilated left gonadal vein that may indicate pelvic venous congestive syndrome. She is following up on these results today. She does note that her periods have seemed heavier.     Review of Systems      Allergies   Allergen Reactions   • Erythromycin Palpitations and Arrhythmia   • Erythromycin Base Palpitations   • Nuts Hives and Itching     Cashews; itching in throat   • Shellfish-Derived Products Itching and Rash     Itching throat       Past Medical History:   Diagnosis Date   • Allergic rhinitis, unspecified    • Atopic dermatitis, unspecified    • Interstitial cystitis    • Iron deficiency anemia of pregnancy    • Neoplasm of uncertain behavior of skin    • Squamous cell carcinoma of skin of other part of trunk      Current Outpatient Medications   Medication Sig Dispense Refill   • Phenylephrine-APAP-guaiFENesin (SINUS RELIEF CONGESTION-PAIN PO) Take  by mouth.     • omeprazole (priLOSEC) 40 MG capsule Take 1 capsule by mouth Daily. 30 capsule 1   • tamsulosin (FLOMAX) 0.4 MG capsule 24 hr capsule Take 1 capsule by mouth Daily. 30 capsule 0     No current facility-administered  medications for this visit.     Past Surgical History:   Procedure Laterality Date   • BLADDER EXTROPHY RECONSTRUCTION PELVIC SAGITTAL OSTEOTOMY  03/2017    GYN reconstructive sx   • BREAST AUGMENTATION  05/23/2012    below the muscle   • BREAST SURGERY  5/23/2013   • HERNIA REPAIR  10/023/15, 2/18/16    10/023/15, 2/18/16; umbilical; 11/26/19 ventral hernia repair   • SCAR REVISION ARM      had a cut on arm and then had scar revision      Social History     Tobacco Use   • Smoking status: Never Smoker   • Smokeless tobacco: Never Used   Vaping Use   • Vaping Use: Never used   Substance Use Topics   • Alcohol use: Yes     Comment: Socially   • Drug use: Never     Family History   Problem Relation Age of Onset   • Peripheral vascular disease Mother         smoker   • Alcohol abuse Mother    • Cirrhosis Father         alcohol   • Alcohol abuse Father    • Liver disease Father    • Bone cancer Paternal Uncle      There are no preventive care reminders to display for this patient.   Immunization History   Administered Date(s) Administered   • Tdap 05/24/2018        Objective     There were no vitals filed for this visit.  There is no height or weight on file to calculate BMI.     Physical Exam  Vitals reviewed.   Constitutional:       General: She is not in acute distress.     Appearance: Normal appearance. She is well-developed.   HENT:      Head: Normocephalic and atraumatic.   Pulmonary:      Effort: Pulmonary effort is normal.   Neurological:      Mental Status: She is alert and oriented to person, place, and time.   Psychiatric:         Mood and Affect: Mood and affect normal.           Result Review :     The following data was reviewed by: PARK Landry on 12/09/2021:               CT Abdomen Pelvis With Contrast (12/03/2021 10:32)  US Abdomen Limited (11/18/2021 12:39)             Assessment and Plan      Diagnoses and all orders for this visit:    1. Gastroesophageal reflux disease without esophagitis  (Primary)  Comments:  Will treat for GERD with PPI as findings on CT unlikely causing RUQ symptoms. May need GI referral for consideration for EGD.   Orders:  -     omeprazole (priLOSEC) 40 MG capsule; Take 1 capsule by mouth Daily.  Dispense: 30 capsule; Refill: 1    2. Renal calculus or stone  Comments:  Advised increased intake of water. Will likely pass.   Orders:  -     tamsulosin (FLOMAX) 0.4 MG capsule 24 hr capsule; Take 1 capsule by mouth Daily.  Dispense: 30 capsule; Refill: 0    3. Pelvic congestion syndrome  Comments:  Referral to gynecology.   Orders:  -     Ambulatory Referral to Obstetrics / Gynecology              Follow Up     Return for Annual physical.

## 2022-01-03 ENCOUNTER — TELEPHONE (OUTPATIENT)
Dept: FAMILY MEDICINE CLINIC | Age: 42
End: 2022-01-03

## 2022-01-03 NOTE — TELEPHONE ENCOUNTER
Caller: Honey Vizcarra    Relationship: Self    Best call back number: [phone]     What medication are you requesting: SOMETHING FOR A SINUS INFECTION      What are your current symptoms: SINUS PRESSURE,GREEN MUCUS,EARS POPPING     How long have you been experiencing symptoms: ABOUT A WEEK   Have you had these symptoms before:    [x] Yes  [] No    Have you been treated for these symptoms before:   [x] Yes  [] No    If a prescription is needed, what is your preferred pharmacy and phone number: [facility] [address] [place] [address] - [facility]  - [phone]  - [phone] FX     Additional notes:

## 2022-01-05 ENCOUNTER — OFFICE VISIT (OUTPATIENT)
Dept: FAMILY MEDICINE CLINIC | Age: 42
End: 2022-01-05

## 2022-01-05 VITALS
WEIGHT: 128 LBS | HEIGHT: 63 IN | DIASTOLIC BLOOD PRESSURE: 72 MMHG | HEART RATE: 110 BPM | BODY MASS INDEX: 22.68 KG/M2 | SYSTOLIC BLOOD PRESSURE: 101 MMHG

## 2022-01-05 DIAGNOSIS — J32.9 SINUSITIS, UNSPECIFIED CHRONICITY, UNSPECIFIED LOCATION: Primary | ICD-10-CM

## 2022-01-05 PROBLEM — D41.02 NEOPLASM OF UNCERTAIN BEHAVIOR OF LEFT KIDNEY: Status: ACTIVE | Noted: 2022-01-05

## 2022-01-05 PROCEDURE — 99213 OFFICE O/P EST LOW 20 MIN: CPT | Performed by: NURSE PRACTITIONER

## 2022-01-05 RX ORDER — PREDNISONE 10 MG/1
30 TABLET ORAL DAILY
Qty: 15 TABLET | Refills: 0 | Status: SHIPPED | OUTPATIENT
Start: 2022-01-05 | End: 2022-01-10

## 2022-01-05 RX ORDER — AZITHROMYCIN 250 MG/1
TABLET, FILM COATED ORAL
Qty: 6 TABLET | Refills: 0 | Status: SHIPPED | OUTPATIENT
Start: 2022-01-05 | End: 2022-03-10

## 2022-01-05 RX ORDER — PSEUDOEPHEDRINE HCL 30 MG
30 TABLET ORAL EVERY 4 HOURS PRN
Qty: 30 TABLET | Refills: 0 | Status: SHIPPED | OUTPATIENT
Start: 2022-01-05 | End: 2022-03-10

## 2022-01-05 NOTE — PROGRESS NOTES
Chief Complaint  Honey Vizcarra presents to [facility] for Sinus Problem (headache, drainage, pressure X 2 weeks - [facility])    Subjective          History of Present Illness    Honey is here today with c/o headache, PND, sinus pressure, green mucous, ears popping, dizziness. She tested positive for covid on 12/9/21 and notes continued sinus symptoms.. She has been taking Benadryl and OTC decongestant syrup.     Review of Systems      Allergies   Allergen Reactions   • Erythromycin Base Palpitations   • Nuts Hives and Itching     Cashews; itching in throat   • Shellfish-Derived Products Itching and Rash     Itching throat       Past Medical History:   Diagnosis Date   • Allergic rhinitis, unspecified    • Atopic dermatitis, unspecified    • Interstitial cystitis    • Iron deficiency anemia of pregnancy    • Neoplasm of uncertain behavior of skin    • Squamous cell carcinoma of skin of other part of trunk      Current Outpatient Medications   Medication Sig Dispense Refill   • omeprazole (priLOSEC) 40 MG capsule Take 1 capsule by mouth Daily. 30 capsule 1   • tamsulosin (FLOMAX) 0.4 MG capsule 24 hr capsule Take 1 capsule by mouth Daily. 30 capsule 0   • azithromycin (Zithromax Z-Tin) 250 MG tablet Take 2 tablets by mouth on day 1, then 1 tablet daily on days 2-5 6 tablet 0   • predniSONE (DELTASONE) 10 MG tablet Take 3 tablets by mouth Daily for 5 days. 15 tablet 0   • pseudoephedrine (Sudafed) 30 MG tablet Take 1 tablet by mouth Every 4 (Four) Hours As Needed for Congestion. 30 tablet 0     No current facility-administered medications for this visit.     Past Surgical History:   Procedure Laterality Date   • BLADDER EXTROPHY RECONSTRUCTION PELVIC SAGITTAL OSTEOTOMY  03/2017    GYN reconstructive sx   • BREAST AUGMENTATION  05/23/2012    below the muscle   • BREAST SURGERY  5/23/2013   • HERNIA REPAIR  10/023/15, 2/18/16    10/023/15, 2/18/16; umbilical; 11/26/19  "ventral hernia repair   • SCAR REVISION ARM      had a cut on arm and then had scar revision      Social History     Tobacco Use   • Smoking status: Never Smoker   • Smokeless tobacco: Never Used   Vaping Use   • Vaping Use: Never used   Substance Use Topics   • Alcohol use: Yes     Comment: Socially   • Drug use: Never     Family History   Problem Relation Age of Onset   • Peripheral vascular disease Mother         smoker   • Alcohol abuse Mother    • Cirrhosis Father         alcohol   • Alcohol abuse Father    • Liver disease Father    • Bone cancer Paternal Uncle      There are no preventive care reminders to display for this patient.   Immunization History   Administered Date(s) Administered   • Tdap 05/24/2018        Objective     Vitals:    01/05/22 1020   BP: 101/72   BP Location: Left arm   Patient Position: Sitting   Pulse: 110   Weight: 58.1 kg (128 lb)   Height: 160 cm (63\")     Body mass index is 22.67 kg/m².     Physical Exam  Constitutional:       Appearance: Normal appearance.   HENT:      Head: Normocephalic.      Right Ear: Ear canal normal. A middle ear effusion is present.      Left Ear: Ear canal normal. A middle ear effusion is present.      Nose: Congestion present.      Right Sinus: Maxillary sinus tenderness and frontal sinus tenderness present.      Left Sinus: Maxillary sinus tenderness and frontal sinus tenderness present.      Mouth/Throat:      Mouth: Mucous membranes are moist.   Eyes:      Pupils: Pupils are equal, round, and reactive to light.   Cardiovascular:      Rate and Rhythm: Normal rate and regular rhythm.   Pulmonary:      Effort: Pulmonary effort is normal.      Breath sounds: Normal breath sounds.   Skin:     General: Skin is warm.   Neurological:      General: No focal deficit present.      Mental Status: She is alert.   Psychiatric:         Mood and Affect: Mood normal.           Result Review :                               Assessment and Plan      Diagnoses and all " orders for this visit:    1. Sinusitis, unspecified chronicity, unspecified location (Primary)  Comments:  Rest, drink plenty of fluids.  Orders:  -     pseudoephedrine (Sudafed) 30 MG tablet; Take 1 tablet by mouth Every 4 (Four) Hours As Needed for Congestion.  Dispense: 30 tablet; Refill: 0  -     azithromycin (Zithromax Z-Tin) 250 MG tablet; Take 2 tablets by mouth on day 1, then 1 tablet daily on days 2-5  Dispense: 6 tablet; Refill: 0  -     predniSONE (DELTASONE) 10 MG tablet; Take 3 tablets by mouth Daily for 5 days.  Dispense: 15 tablet; Refill: 0              Follow Up     Return for As needed for persistent or worsening symptoms.

## 2022-02-16 PROBLEM — N92.0 MENORRHAGIA: Status: ACTIVE | Noted: 2022-02-16

## 2022-03-02 ENCOUNTER — TRANSCRIBE ORDERS (OUTPATIENT)
Dept: FAMILY MEDICINE CLINIC | Age: 42
End: 2022-03-02

## 2022-03-02 DIAGNOSIS — Z01.818 PRE-OP TESTING: Primary | ICD-10-CM

## 2022-03-11 ENCOUNTER — TELEPHONE (OUTPATIENT)
Dept: FAMILY MEDICINE CLINIC | Age: 42
End: 2022-03-11

## 2022-03-11 DIAGNOSIS — Z12.31 VISIT FOR SCREENING MAMMOGRAM: Primary | ICD-10-CM

## 2022-03-11 NOTE — TELEPHONE ENCOUNTER
Pt informed due for mammogram. States she cant do mammogram until after 3/22/22. Mammo order queued/corie

## 2022-03-15 ENCOUNTER — CLINICAL SUPPORT (OUTPATIENT)
Dept: FAMILY MEDICINE CLINIC | Age: 42
End: 2022-03-15

## 2022-03-15 DIAGNOSIS — Z01.818 PRE-OP TESTING: ICD-10-CM

## 2022-03-15 LAB — SARS-COV-2 RNA PNL SPEC NAA+PROBE: NOT DETECTED

## 2022-03-15 PROCEDURE — U0004 COV-19 TEST NON-CDC HGH THRU: HCPCS | Performed by: OBSTETRICS & GYNECOLOGY

## 2022-03-15 PROCEDURE — 99211 OFF/OP EST MAY X REQ PHY/QHP: CPT | Performed by: FAMILY MEDICINE

## 2022-04-22 ENCOUNTER — APPOINTMENT (OUTPATIENT)
Dept: MAMMOGRAPHY | Facility: HOSPITAL | Age: 42
End: 2022-04-22

## 2022-06-03 ENCOUNTER — HOSPITAL ENCOUNTER (OUTPATIENT)
Dept: MAMMOGRAPHY | Facility: HOSPITAL | Age: 42
Discharge: HOME | End: 2022-06-03
Admitting: NURSE PRACTITIONER

## 2022-06-03 DIAGNOSIS — Z12.31 VISIT FOR SCREENING MAMMOGRAM: ICD-10-CM

## 2022-06-03 PROCEDURE — 77063 BREAST TOMOSYNTHESIS BI: CPT

## 2022-06-03 PROCEDURE — 77067 SCR MAMMO BI INCL CAD: CPT

## 2022-08-23 ENCOUNTER — TELEPHONE (OUTPATIENT)
Dept: FAMILY MEDICINE CLINIC | Age: 42
End: 2022-08-23

## 2022-10-06 ENCOUNTER — OFFICE VISIT (OUTPATIENT)
Dept: FAMILY MEDICINE CLINIC | Age: 42
End: 2022-10-06

## 2022-10-06 ENCOUNTER — LAB (OUTPATIENT)
Dept: LAB | Facility: HOSPITAL | Age: 42
End: 2022-10-06

## 2022-10-06 VITALS
SYSTOLIC BLOOD PRESSURE: 114 MMHG | WEIGHT: 126 LBS | DIASTOLIC BLOOD PRESSURE: 72 MMHG | HEART RATE: 94 BPM | BODY MASS INDEX: 21.51 KG/M2 | TEMPERATURE: 99 F | HEIGHT: 64 IN

## 2022-10-06 DIAGNOSIS — M79.89 SWELLING OF LOWER EXTREMITY: ICD-10-CM

## 2022-10-06 DIAGNOSIS — Z98.890 HISTORY OF MOHS MICROGRAPHIC SURGERY FOR SQUAMOUS CELL CARCINOMA IN SITU (SCCIS) OF SKIN: ICD-10-CM

## 2022-10-06 DIAGNOSIS — Z00.00 ANNUAL PHYSICAL EXAM: ICD-10-CM

## 2022-10-06 DIAGNOSIS — Z00.00 ANNUAL PHYSICAL EXAM: Primary | ICD-10-CM

## 2022-10-06 DIAGNOSIS — N92.6 IRREGULAR PERIODS/MENSTRUAL CYCLES: ICD-10-CM

## 2022-10-06 DIAGNOSIS — Z12.31 ENCOUNTER FOR SCREENING MAMMOGRAM FOR MALIGNANT NEOPLASM OF BREAST: ICD-10-CM

## 2022-10-06 DIAGNOSIS — Z86.007 HISTORY OF MOHS MICROGRAPHIC SURGERY FOR SQUAMOUS CELL CARCINOMA IN SITU (SCCIS) OF SKIN: ICD-10-CM

## 2022-10-06 PROBLEM — Z87.442 HISTORY OF RENAL STONE: Status: ACTIVE | Noted: 2022-10-06

## 2022-10-06 LAB
ALBUMIN SERPL-MCNC: 4.5 G/DL (ref 3.5–5.2)
ALBUMIN/GLOB SERPL: 1.8 G/DL
ALP SERPL-CCNC: 81 U/L (ref 39–117)
ALT SERPL W P-5'-P-CCNC: 27 U/L (ref 1–33)
ANION GAP SERPL CALCULATED.3IONS-SCNC: 9.5 MMOL/L (ref 5–15)
AST SERPL-CCNC: 26 U/L (ref 1–32)
BASOPHILS # BLD AUTO: 0.07 10*3/MM3 (ref 0–0.2)
BASOPHILS NFR BLD AUTO: 1.2 % (ref 0–1.5)
BILIRUB SERPL-MCNC: <0.2 MG/DL (ref 0–1.2)
BUN SERPL-MCNC: 6 MG/DL (ref 6–20)
BUN/CREAT SERPL: 8.5 (ref 7–25)
CALCIUM SPEC-SCNC: 9.6 MG/DL (ref 8.6–10.5)
CHLORIDE SERPL-SCNC: 106 MMOL/L (ref 98–107)
CO2 SERPL-SCNC: 26.5 MMOL/L (ref 22–29)
CREAT SERPL-MCNC: 0.71 MG/DL (ref 0.57–1)
DEPRECATED RDW RBC AUTO: 49.8 FL (ref 37–54)
EGFRCR SERPLBLD CKD-EPI 2021: 109 ML/MIN/1.73
EOSINOPHIL # BLD AUTO: 0.22 10*3/MM3 (ref 0–0.4)
EOSINOPHIL NFR BLD AUTO: 3.9 % (ref 0.3–6.2)
ERYTHROCYTE [DISTWIDTH] IN BLOOD BY AUTOMATED COUNT: 15.7 % (ref 12.3–15.4)
GLOBULIN UR ELPH-MCNC: 2.5 GM/DL
GLUCOSE SERPL-MCNC: 80 MG/DL (ref 65–99)
HCT VFR BLD AUTO: 37.8 % (ref 34–46.6)
HGB BLD-MCNC: 11.5 G/DL (ref 12–15.9)
IMM GRANULOCYTES # BLD AUTO: 0.01 10*3/MM3 (ref 0–0.05)
IMM GRANULOCYTES NFR BLD AUTO: 0.2 % (ref 0–0.5)
LYMPHOCYTES # BLD AUTO: 1.12 10*3/MM3 (ref 0.7–3.1)
LYMPHOCYTES NFR BLD AUTO: 19.6 % (ref 19.6–45.3)
MCH RBC QN AUTO: 26.3 PG (ref 26.6–33)
MCHC RBC AUTO-ENTMCNC: 30.4 G/DL (ref 31.5–35.7)
MCV RBC AUTO: 86.3 FL (ref 79–97)
MONOCYTES # BLD AUTO: 0.48 10*3/MM3 (ref 0.1–0.9)
MONOCYTES NFR BLD AUTO: 8.4 % (ref 5–12)
NEUTROPHILS NFR BLD AUTO: 3.8 10*3/MM3 (ref 1.7–7)
NEUTROPHILS NFR BLD AUTO: 66.7 % (ref 42.7–76)
PLATELET # BLD AUTO: 327 10*3/MM3 (ref 140–450)
PMV BLD AUTO: 9.9 FL (ref 6–12)
POTASSIUM SERPL-SCNC: 3.7 MMOL/L (ref 3.5–5.2)
PROT SERPL-MCNC: 7 G/DL (ref 6–8.5)
RBC # BLD AUTO: 4.38 10*6/MM3 (ref 3.77–5.28)
SODIUM SERPL-SCNC: 142 MMOL/L (ref 136–145)
TSH SERPL DL<=0.05 MIU/L-ACNC: 2.39 UIU/ML (ref 0.27–4.2)
WBC NRBC COR # BLD: 5.7 10*3/MM3 (ref 3.4–10.8)

## 2022-10-06 PROCEDURE — 80053 COMPREHEN METABOLIC PANEL: CPT

## 2022-10-06 PROCEDURE — 84443 ASSAY THYROID STIM HORMONE: CPT

## 2022-10-06 PROCEDURE — 36415 COLL VENOUS BLD VENIPUNCTURE: CPT

## 2022-10-06 PROCEDURE — 85025 COMPLETE CBC W/AUTO DIFF WBC: CPT

## 2022-10-06 PROCEDURE — 99396 PREV VISIT EST AGE 40-64: CPT | Performed by: NURSE PRACTITIONER

## 2022-10-06 RX ORDER — ANTIARTHRITIC COMBINATION NO.2 900 MG
5000 TABLET ORAL DAILY
COMMUNITY
End: 2023-01-04

## 2022-10-06 NOTE — PROGRESS NOTES
Chief Complaint  Honey Vizcarra presents to [facility] for Annual Exam    Subjective          History of Present Illness    Honey is here for annual preventive exam today.   UTD Tdap vaccine.   Declines influenza, covid vaccine.   Mammogram UTD 6/3/22 normal.   Never smoker.     Pap smear 3/2022 with Dr Campbell. She has seen Dr Campblel in past year for irregular periods. She was scheduled for ablation. This was canceled due to her child being ill. She notes periods have not improved. She is not sure that she wants to proceed with ablation.    Hx squamous cell carcinoma s/p MOHS procedure 2020. She is followed by dermatology once yearly. Sees [facility] in [place].     Previously diagnosed with interstitial cystitis. Managed by diet. No longer on medications.     She c/o bilateral lower extremity swelling. Seems to be related to eating dairy. This has been an intermittent issue.     Review of Systems   Constitutional: Negative for chills and fever.   HENT: Negative for ear pain and sore throat.    Eyes: Negative for blurred vision and redness.   Respiratory: Negative for cough, shortness of breath and wheezing.    Cardiovascular: Positive for leg swelling. Negative for chest pain and palpitations.   Gastrointestinal: Negative for abdominal pain, constipation, diarrhea, nausea and vomiting.   Genitourinary: Positive for menstrual problem. Negative for dysuria.   Musculoskeletal: Negative for back pain and joint swelling.   Skin: Negative for rash.   Neurological: Negative for dizziness and headache.   Psychiatric/Behavioral: Negative for suicidal ideas and depressed mood.         Allergies   Allergen Reactions   • Erythromycin Base Palpitations   • Nuts Hives and Itching     Cashews; itching in throat   • Shellfish-Derived Products Itching and Rash     Itching throat       Past Medical History:   Diagnosis Date   • Allergic rhinitis, unspecified    • Atopic  "dermatitis, unspecified    • Interstitial cystitis    • Iron deficiency anemia of pregnancy    • Neoplasm of uncertain behavior of skin    • Squamous cell carcinoma of skin of other part of trunk      Current Outpatient Medications   Medication Sig Dispense Refill   • Biotin 5000 MCG tablet Take 5,000 mcg by mouth Daily.     • Prenatal Vit-Fe Fumarate-FA (PRENATAL PO) Take  by mouth Daily.       No current facility-administered medications for this visit.     Past Surgical History:   Procedure Laterality Date   • BLADDER EXTROPHY RECONSTRUCTION PELVIC SAGITTAL OSTEOTOMY  03/2017    GYN reconstructive sx   • BREAST AUGMENTATION  05/23/2012    below the muscle   • BREAST SURGERY  5/23/2013   • HERNIA REPAIR  10/023/15, 2/18/16    10/023/15, 2/18/16; umbilical; 11/26/19 ventral hernia repair   • SCAR REVISION ARM      had a cut on arm and then had scar revision      Social History     Tobacco Use   • Smoking status: Never Smoker   • Smokeless tobacco: Never Used   Vaping Use   • Vaping Use: Never used   Substance Use Topics   • Alcohol use: Yes     Comment: Socially   • Drug use: Never     Family History   Problem Relation Age of Onset   • Peripheral vascular disease Mother         smoker   • Alcohol abuse Mother    • Cirrhosis Father         alcohol   • Alcohol abuse Father    • Liver disease Father    • Bone cancer Paternal Uncle      Health Maintenance Due   Topic Date Due   • ANNUAL PHYSICAL  08/24/2022      Immunization History   Administered Date(s) Administered   • Tdap 05/24/2018        Objective     Vitals:    10/06/22 1438   BP: 114/72   BP Location: Left arm   Patient Position: Sitting   Pulse: 94   Temp: 99 °F (37.2 °C)   TempSrc: Oral   Weight: 57.2 kg (126 lb)   Height: 162.6 cm (64\")     Body mass index is 21.63 kg/m².     Physical Exam  Vitals reviewed.   Constitutional:       General: She is not in acute distress.     Appearance: Normal appearance. She is well-developed.   HENT:      Head: " Normocephalic and atraumatic.      Right Ear: Hearing, tympanic membrane and ear canal normal.      Left Ear: Hearing, tympanic membrane and ear canal normal.      Mouth/Throat:      Mouth: Mucous membranes are moist.   Eyes:      Extraocular Movements: Extraocular movements intact.      Pupils: Pupils are equal, round, and reactive to light.   Cardiovascular:      Rate and Rhythm: Normal rate and regular rhythm.      Pulses: Normal pulses.      Heart sounds: Normal heart sounds.   Pulmonary:      Effort: Pulmonary effort is normal.      Breath sounds: Normal breath sounds.   Abdominal:      General: Bowel sounds are normal.      Palpations: Abdomen is soft.      Tenderness: There is no abdominal tenderness.   Musculoskeletal:         General: Normal range of motion.      Cervical back: Normal range of motion and neck supple.      Right lower leg: No edema.      Left lower leg: No edema.   Skin:     General: Skin is warm and dry.   Neurological:      Mental Status: She is alert and oriented to person, place, and time.   Psychiatric:         Mood and Affect: Mood normal.           Result Review :     The following data was reviewed by: PARK Landry on 10/06/2022:          Hepatitis C Antibody (08/23/2021 13:13)  Lipid Panel (08/23/2021 13:13)  Comprehensive Metabolic Panel (08/23/2021 13:13)  CBC Auto Differential (08/23/2021 13:13)  TSH (08/23/2021 13:13)                  Assessment and Plan      Diagnoses and all orders for this visit:    1. Annual physical exam (Primary)  Comments:  Appropriate screenings and vaccinations were reviewed with the pt and offered as indicated.  Pt counseled on healthy lifestyle including healthy diet, exercise.  Orders:  -     CBC & Differential; Future  -     Comprehensive Metabolic Panel; Future  -     TSH; Future    2. Encounter for screening mammogram for malignant neoplasm of breast  -     Mammo Screening Digital Tomosynthesis Bilateral With CAD; Future    3. Irregular  periods/menstrual cycles  Comments:  Recommend continued follow up with GYN    4. Swelling of lower extremity  Comments:  No edema noted today. Will proceed with labs. May be related to diet.     5. History of Mohs micrographic surgery for squamous cell carcinoma in situ (SCCIS) of skin  Comments:  Continue follow up with dermatology                  Follow Up     Return in about 1 year (around 10/6/2023) for Annual physical.

## 2023-01-04 ENCOUNTER — OFFICE VISIT (OUTPATIENT)
Dept: FAMILY MEDICINE CLINIC | Age: 43
End: 2023-01-04
Payer: COMMERCIAL

## 2023-01-04 VITALS
BODY MASS INDEX: 22.36 KG/M2 | SYSTOLIC BLOOD PRESSURE: 108 MMHG | HEART RATE: 86 BPM | OXYGEN SATURATION: 97 % | DIASTOLIC BLOOD PRESSURE: 68 MMHG | WEIGHT: 131 LBS | HEIGHT: 64 IN | TEMPERATURE: 98.3 F

## 2023-01-04 DIAGNOSIS — R22.0 NODULE OF SKIN OF HEAD: Primary | ICD-10-CM

## 2023-01-04 DIAGNOSIS — N92.0 MENORRHAGIA WITH REGULAR CYCLE: ICD-10-CM

## 2023-01-04 DIAGNOSIS — R10.9 ABDOMINAL PAIN, UNSPECIFIED ABDOMINAL LOCATION: ICD-10-CM

## 2023-01-04 PROCEDURE — 99214 OFFICE O/P EST MOD 30 MIN: CPT | Performed by: NURSE PRACTITIONER

## 2023-01-04 NOTE — PROGRESS NOTES
Chief Complaint  Honey Vee presents to [facility] for Mass (Lump on back of right ear, changing sizes, painful, X 1 year )    Subjective          History of Present Illness    Honey is here today with c/o knot behind right ear. She first noticed this about one year ago. It has gotten larger in size. It is tender at times. Was previously told that it was a cyst. She is concerned as she has history of squamous cell carcinoma s/p she had MOHS procedure previously.   She additionally notes ongoing abdominal symptoms including bilateral upper quadrant and epigastric area discomfort and nausea. RUQ US on 11/8/2021 was normal. CT abdomen/pelvis w contrast performed on 12/3/2021. Showed nonobstructing 4 mg right lower pole renal calculus. Also significant prominence of the pelvic venous structures including a dilated left gonadal vein that may indicate pelvic venous congestive syndrome. Was seeing gynecology for heavy periods. Was going to have D&C but Dr Campbell no longer practicing. She is still experiencing discomfort. She did a course of PPI without improvement in symptoms. Has history of hernia. Has not had recent EGD.     Review of Systems      Allergies   Allergen Reactions   • Erythromycin Base Palpitations   • Nuts Hives and Itching     Cashews; itching in throat   • Shellfish-Derived Products Itching and Rash     Itching throat       Past Medical History:   Diagnosis Date   • Allergic rhinitis, unspecified    • Atopic dermatitis, unspecified    • Interstitial cystitis    • Iron deficiency anemia of pregnancy    • Neoplasm of uncertain behavior of skin    • Squamous cell carcinoma of skin of other part of trunk      No current outpatient medications on file.     No current facility-administered medications for this visit.     Past Surgical History:   Procedure Laterality Date   • BLADDER EXTROPHY RECONSTRUCTION PELVIC SAGITTAL OSTEOTOMY  03/2017    GYN reconstructive sx    • BREAST AUGMENTATION  05/23/2012    below the muscle   • BREAST SURGERY  5/23/2013   • HERNIA REPAIR  10/023/15, 2/18/16    10/023/15, 2/18/16; umbilical; 11/26/19 ventral hernia repair   • SCAR REVISION ARM      had a cut on arm and then had scar revision      Social History     Tobacco Use   • Smoking status: Never   • Smokeless tobacco: Never   Vaping Use   • Vaping Use: Never used   Substance Use Topics   • Alcohol use: Yes     Comment: Socially   • Drug use: Never     Family History   Problem Relation Age of Onset   • Peripheral vascular disease Mother         smoker   • Alcohol abuse Mother    • Cirrhosis Father         alcohol   • Alcohol abuse Father    • Liver disease Father    • Bone cancer Paternal Uncle      There are no preventive care reminders to display for this patient.   Immunization History   Administered Date(s) Administered   • Tdap 05/24/2018        Objective     Vitals:    01/04/23 1203   BP: 108/68   BP Location: Right arm   Patient Position: Sitting   Pulse: 86   Temp: 98.3 °F (36.8 °C)   TempSrc: Oral   SpO2: 97%   Weight: 59.4 kg (131 lb)   Height: 162.6 cm (64\")     Body mass index is 22.49 kg/m².     Physical Exam  Vitals reviewed.   Constitutional:       General: She is not in acute distress.     Appearance: Normal appearance. She is well-developed.   HENT:      Head: Normocephalic and atraumatic.   Cardiovascular:      Rate and Rhythm: Normal rate.   Pulmonary:      Effort: Pulmonary effort is normal.   Skin:     Comments: Palpable nodule behind right ear   Neurological:      Mental Status: She is alert and oriented to person, place, and time.   Psychiatric:         Mood and Affect: Mood and affect normal.           Result Review :                               Assessment and Plan      Diagnoses and all orders for this visit:    1. Nodule of skin of head (Primary)  Comments:  Will get her set up for US for additional evaluation.  Orders:  -     US Head Neck Soft Tissue; Future    2.  Abdominal pain, unspecified abdominal location  Comments:  Gastro referral for additional evaluation of continued abdominal symptoms.   Orders:  -     Ambulatory Referral to Gastroenterology    3. Menorrhagia with regular cycle  Comments:  She will schedule with new GYN. Will let me know if she needs referral. Encouraged iron rich food in diet.               Follow Up     Return for Next scheduled follow up.

## 2023-01-11 ENCOUNTER — HOSPITAL ENCOUNTER (OUTPATIENT)
Dept: ULTRASOUND IMAGING | Facility: HOSPITAL | Age: 43
Discharge: HOME | End: 2023-01-11
Admitting: NURSE PRACTITIONER
Payer: COMMERCIAL

## 2023-01-11 DIAGNOSIS — R22.0 NODULE OF SKIN OF HEAD: ICD-10-CM

## 2023-01-11 PROCEDURE — 76536 US EXAM OF HEAD AND NECK: CPT

## 2023-01-12 DIAGNOSIS — L02.91 ABSCESS: Primary | ICD-10-CM

## 2023-01-12 RX ORDER — SULFAMETHOXAZOLE AND TRIMETHOPRIM 800; 160 MG/1; MG/1
1 TABLET ORAL 2 TIMES DAILY
Qty: 20 TABLET | Refills: 0 | Status: SHIPPED | OUTPATIENT
Start: 2023-01-12 | End: 2023-02-06

## 2023-02-06 ENCOUNTER — OFFICE VISIT (OUTPATIENT)
Dept: FAMILY MEDICINE CLINIC | Age: 43
End: 2023-02-06
Payer: COMMERCIAL

## 2023-02-06 VITALS
HEIGHT: 64 IN | SYSTOLIC BLOOD PRESSURE: 113 MMHG | WEIGHT: 130 LBS | BODY MASS INDEX: 22.2 KG/M2 | DIASTOLIC BLOOD PRESSURE: 73 MMHG | HEART RATE: 94 BPM | TEMPERATURE: 99 F

## 2023-02-06 DIAGNOSIS — R22.0 NODULE OF SKIN OF HEAD: Primary | ICD-10-CM

## 2023-02-06 DIAGNOSIS — L02.91 ABSCESS: ICD-10-CM

## 2023-02-06 PROCEDURE — 99214 OFFICE O/P EST MOD 30 MIN: CPT | Performed by: NURSE PRACTITIONER

## 2023-02-06 NOTE — PROGRESS NOTES
Chief Complaint  Honey Vee presents to [facility] for Abscess (Follow up )    Subjective          History of Present Illness    Honey is here for follow up on knot behind right ear. First noticed one year ago. She had US performed that showed subcutaneous lesion of about 7X9X5 mm. It was vascular and irregular in shape with some internal specular echoes that could represent a small abscess. She was prescribed Bactrim course. She has completed this course but does not feel like any improvement. She notes that it feels firmer to her than previous.     Review of Systems      Allergies   Allergen Reactions   • Erythromycin Base Palpitations   • Nuts Hives and Itching     Cashews; itching in throat   • Shellfish-Derived Products Itching and Rash     Itching throat       Past Medical History:   Diagnosis Date   • Allergic rhinitis, unspecified    • Atopic dermatitis, unspecified    • Interstitial cystitis    • Iron deficiency anemia of pregnancy    • Neoplasm of uncertain behavior of skin    • Squamous cell carcinoma of skin of other part of trunk      No current outpatient medications on file.     No current facility-administered medications for this visit.     Past Surgical History:   Procedure Laterality Date   • BLADDER EXTROPHY RECONSTRUCTION PELVIC SAGITTAL OSTEOTOMY  03/2017    GYN reconstructive sx   • BREAST AUGMENTATION  05/23/2012    below the muscle   • BREAST SURGERY  5/23/2013   • HERNIA REPAIR  10/023/15, 2/18/16    10/023/15, 2/18/16; umbilical; 11/26/19 ventral hernia repair   • SCAR REVISION ARM      had a cut on arm and then had scar revision      Social History     Tobacco Use   • Smoking status: Never     Passive exposure: Never   • Smokeless tobacco: Never   Vaping Use   • Vaping Use: Never used   Substance Use Topics   • Alcohol use: Yes     Comment: Socially   • Drug use: Never     Family History   Problem Relation Age of Onset   • Peripheral vascular  "disease Mother         smoker   • Alcohol abuse Mother    • Cirrhosis Father         alcohol   • Alcohol abuse Father    • Liver disease Father    • Bone cancer Paternal Uncle      There are no preventive care reminders to display for this patient.   Immunization History   Administered Date(s) Administered   • Tdap 05/24/2018        Objective     Vitals:    02/06/23 1517   BP: 113/73   BP Location: Right arm   Patient Position: Sitting   Pulse: 94   Temp: 99 °F (37.2 °C)   TempSrc: Oral   Weight: 59 kg (130 lb)   Height: 162.6 cm (64\")     Body mass index is 22.31 kg/m².     Physical Exam  Vitals reviewed.   Constitutional:       General: She is not in acute distress.     Appearance: Normal appearance. She is well-developed.   HENT:      Head: Normocephalic and atraumatic.   Cardiovascular:      Rate and Rhythm: Normal rate.   Pulmonary:      Effort: Pulmonary effort is normal.   Skin:     Comments: Palpable nodule behind right ear, no erythema or drainage, not warm to touch   Neurological:      Mental Status: She is alert and oriented to person, place, and time.   Psychiatric:         Mood and Affect: Mood and affect normal.           Result Review :                               Assessment and Plan      Diagnoses and all orders for this visit:    1. Nodule of skin of head (Primary)  -     Ambulatory Referral to General Surgery    2. Abscess  -     Ambulatory Referral to General Surgery      Will get her set up with general surgery for additional evaluation as no improvement with antibiotic course and location.          Follow Up     Return for As needed for persistent or worsening symptoms.             "

## 2023-04-25 ENCOUNTER — OFFICE VISIT (OUTPATIENT)
Dept: SURGERY | Facility: CLINIC | Age: 43
End: 2023-04-25
Payer: COMMERCIAL

## 2023-04-25 VITALS — RESPIRATION RATE: 16 BRPM | WEIGHT: 130 LBS | HEIGHT: 63 IN | BODY MASS INDEX: 23.04 KG/M2

## 2023-04-25 DIAGNOSIS — D36.7 DERMOID CYST OF NECK: Primary | ICD-10-CM

## 2023-04-25 NOTE — PROGRESS NOTES
Inpatient History and Physical Surgical Orders    Preadmission Location:   Preadmission Time:  Facility:  Surgery Date:  Surgery Time:  Preadmission Test date:     Chief Complaint  Outpatient History and Physical / Surgical Orders    Primary Care Provider: Katlyn Jasmine APRN    Referring Provider: Katlyn Jasmine APRN    Subjective      Patient Name: Honey Vee : 1980    HPI  The patient is a 43-year-old female who presents with a palpable nodule in the subcutaneous tissues below the right ear.  She noticed a little discomfort there and when saw her doctor.  She had an ultrasound ordered which suggested a possible skin cyst that was infected.  She was treated with antibiotics and has watched this for a couple months and it does seem to be smaller and is not uncomfortable at this point.    Past History:  Medical History: has a past medical history of Allergic rhinitis, unspecified, Atopic dermatitis, unspecified, Interstitial cystitis, Iron deficiency anemia of pregnancy, Neoplasm of uncertain behavior of skin, and Squamous cell carcinoma of skin of other part of trunk.   Surgical History: has a past surgical history that includes Breast Augmentation (2012); Scar Revision Arm; Hernia repair (10/023/15, 16); Bladder extrophy reconstruction pelvic sagittal osteotomy (2017); and Breast surgery (2013).   Family History: family history includes Alcohol abuse in her father and mother; Bone cancer in her paternal uncle; Cirrhosis in her father; Liver disease in her father; Peripheral vascular disease in her mother.   Social History: reports that she has never smoked. She has never been exposed to tobacco smoke. She has never used smokeless tobacco. She reports current alcohol use. She reports that she does not use drugs.  Allergies: Erythromycin base, Nuts, and Shellfish-derived products       Current Outpatient Medications:   •  Cyanocobalamin (B-12 PO), Take  by mouth., Disp: , Rfl:   •   "Ferrous Sulfate (IRON PO), Take  by mouth., Disp: , Rfl:        Objective   Vital Signs:   Resp 16   Ht 160 cm (63\")   Wt 59 kg (130 lb)   BMI 23.03 kg/m²       Physical Exam  Vitals and nursing note reviewed.   Constitutional:       Appearance: Normal appearance. The patient is well-developed.   Cardiovascular:      Rate and Rhythm: Normal rate and regular rhythm.   Pulmonary:      Effort: Pulmonary effort is normal.      Breath sounds: Normal air entry.   Abdominal:      General: Bowel sounds are normal.      Palpations: Abdomen is soft.      Skin:     General: Skin is warm and dry.   Neurological:      Mental Status: The patient is alert and oriented to person, place, and time.      Motor: Motor function is intact.   Psychiatric:         Mood and Affect: Mood normal.   Neck: She has a small palpable nodule below the right ear right behind the angle of the right mandible.  It feels benign and could represent either a small sebaceous cyst or perhaps a small benign lymph node.  It is smooth and very mobile.    Result Review :               Assessment and Plan   Diagnoses and all orders for this visit:    1. Dermoid cyst of neck (Primary)    At this point this lesion seems to be benign.  It is getting smaller and at this point is not hurting her.  The physical characteristics point towards a benign lesion and it this point she was comfortable just following this expectantly.  I will see her back on an as-needed basis.    I  Manolo Wooten MD  04/25/2023    "

## 2023-06-20 PROBLEM — R09.89 GLOBUS SENSATION: Status: ACTIVE | Noted: 2023-06-20

## 2023-06-20 PROBLEM — R09.A2 GLOBUS SENSATION: Status: ACTIVE | Noted: 2023-06-20

## 2023-06-20 PROBLEM — R10.11 RUQ PAIN: Status: ACTIVE | Noted: 2023-06-20

## 2023-06-20 PROBLEM — R09.89 THROAT CLEARING: Status: ACTIVE | Noted: 2023-06-20

## 2023-06-20 PROBLEM — R09.82 POST-NASAL DRIP: Status: ACTIVE | Noted: 2023-06-20

## 2023-08-14 ENCOUNTER — HOSPITAL ENCOUNTER (OUTPATIENT)
Dept: MAMMOGRAPHY | Facility: HOSPITAL | Age: 43
Discharge: HOME | End: 2023-08-14
Admitting: NURSE PRACTITIONER
Payer: COMMERCIAL

## 2023-08-14 DIAGNOSIS — Z12.31 ENCOUNTER FOR SCREENING MAMMOGRAM FOR MALIGNANT NEOPLASM OF BREAST: ICD-10-CM

## 2023-08-14 PROCEDURE — 77063 BREAST TOMOSYNTHESIS BI: CPT

## 2023-08-14 PROCEDURE — 77067 SCR MAMMO BI INCL CAD: CPT

## 2023-08-28 ENCOUNTER — TELEPHONE (OUTPATIENT)
Dept: GASTROENTEROLOGY | Facility: CLINIC | Age: 43
End: 2023-08-28
Payer: COMMERCIAL

## 2023-11-10 ENCOUNTER — TELEPHONE (OUTPATIENT)
Dept: GASTROENTEROLOGY | Facility: CLINIC | Age: 43
End: 2023-11-10
Payer: COMMERCIAL